# Patient Record
Sex: FEMALE | Race: WHITE | NOT HISPANIC OR LATINO | URBAN - METROPOLITAN AREA
[De-identification: names, ages, dates, MRNs, and addresses within clinical notes are randomized per-mention and may not be internally consistent; named-entity substitution may affect disease eponyms.]

---

## 2017-11-09 ENCOUNTER — EMERGENCY (EMERGENCY)
Facility: HOSPITAL | Age: 52
LOS: 1 days | Discharge: HOME | End: 2017-11-09
Attending: EMERGENCY MEDICINE | Admitting: EMERGENCY MEDICINE
Payer: MEDICAID

## 2017-11-09 VITALS
DIASTOLIC BLOOD PRESSURE: 101 MMHG | OXYGEN SATURATION: 100 % | HEART RATE: 99 BPM | RESPIRATION RATE: 18 BRPM | TEMPERATURE: 99 F | SYSTOLIC BLOOD PRESSURE: 159 MMHG

## 2017-11-09 LAB
ALBUMIN SERPL ELPH-MCNC: 3.8 G/DL — SIGNIFICANT CHANGE UP (ref 3.3–5)
ALP SERPL-CCNC: 91 U/L — SIGNIFICANT CHANGE UP (ref 40–120)
ALT FLD-CCNC: 16 U/L RC — SIGNIFICANT CHANGE UP (ref 10–45)
ANION GAP SERPL CALC-SCNC: 15 MMOL/L — SIGNIFICANT CHANGE UP (ref 5–17)
AST SERPL-CCNC: 21 U/L — SIGNIFICANT CHANGE UP (ref 10–40)
BASE EXCESS BLDV CALC-SCNC: 6 MMOL/L — HIGH (ref -2–2)
BASOPHILS # BLD AUTO: 0 K/UL — SIGNIFICANT CHANGE UP (ref 0–0.2)
BASOPHILS NFR BLD AUTO: 0.3 % — SIGNIFICANT CHANGE UP (ref 0–2)
BILIRUB SERPL-MCNC: 0.4 MG/DL — SIGNIFICANT CHANGE UP (ref 0.2–1.2)
BUN SERPL-MCNC: 12 MG/DL — SIGNIFICANT CHANGE UP (ref 7–23)
CA-I SERPL-SCNC: 1.1 MMOL/L — LOW (ref 1.12–1.3)
CALCIUM SERPL-MCNC: 8.9 MG/DL — SIGNIFICANT CHANGE UP (ref 8.4–10.5)
CHLORIDE BLDV-SCNC: 97 MMOL/L — SIGNIFICANT CHANGE UP (ref 96–108)
CHLORIDE SERPL-SCNC: 94 MMOL/L — LOW (ref 96–108)
CO2 BLDV-SCNC: 31 MMOL/L — HIGH (ref 22–30)
CO2 SERPL-SCNC: 24 MMOL/L — SIGNIFICANT CHANGE UP (ref 22–31)
CREAT SERPL-MCNC: 0.63 MG/DL — SIGNIFICANT CHANGE UP (ref 0.5–1.3)
EOSINOPHIL # BLD AUTO: 0 K/UL — SIGNIFICANT CHANGE UP (ref 0–0.5)
EOSINOPHIL NFR BLD AUTO: 0.7 % — SIGNIFICANT CHANGE UP (ref 0–6)
GAS PNL BLDV: 129 MMOL/L — LOW (ref 136–145)
GAS PNL BLDV: SIGNIFICANT CHANGE UP
GAS PNL BLDV: SIGNIFICANT CHANGE UP
GLUCOSE BLDV-MCNC: 102 MG/DL — HIGH (ref 70–99)
GLUCOSE SERPL-MCNC: 104 MG/DL — HIGH (ref 70–99)
HCO3 BLDV-SCNC: 30 MMOL/L — HIGH (ref 21–29)
HCT VFR BLD CALC: 42.2 % — SIGNIFICANT CHANGE UP (ref 34.5–45)
HCT VFR BLDA CALC: 42 % — SIGNIFICANT CHANGE UP (ref 39–50)
HGB BLD CALC-MCNC: 13.8 G/DL — SIGNIFICANT CHANGE UP (ref 11.5–15.5)
HGB BLD-MCNC: 14.2 G/DL — SIGNIFICANT CHANGE UP (ref 11.5–15.5)
HOROWITZ INDEX BLDV+IHG-RTO: SIGNIFICANT CHANGE UP
LACTATE BLDV-MCNC: 1.5 MMOL/L — SIGNIFICANT CHANGE UP (ref 0.7–2)
LYMPHOCYTES # BLD AUTO: 1 K/UL — SIGNIFICANT CHANGE UP (ref 1–3.3)
LYMPHOCYTES # BLD AUTO: 18.1 % — SIGNIFICANT CHANGE UP (ref 13–44)
MCHC RBC-ENTMCNC: 32.4 PG — SIGNIFICANT CHANGE UP (ref 27–34)
MCHC RBC-ENTMCNC: 33.7 GM/DL — SIGNIFICANT CHANGE UP (ref 32–36)
MCV RBC AUTO: 96.2 FL — SIGNIFICANT CHANGE UP (ref 80–100)
MONOCYTES # BLD AUTO: 0.5 K/UL — SIGNIFICANT CHANGE UP (ref 0–0.9)
MONOCYTES NFR BLD AUTO: 9.3 % — SIGNIFICANT CHANGE UP (ref 2–14)
NEUTROPHILS # BLD AUTO: 3.8 K/UL — SIGNIFICANT CHANGE UP (ref 1.8–7.4)
NEUTROPHILS NFR BLD AUTO: 71.7 % — SIGNIFICANT CHANGE UP (ref 43–77)
PCO2 BLDV: 42 MMHG — SIGNIFICANT CHANGE UP (ref 35–50)
PH BLDV: 7.47 — HIGH (ref 7.35–7.45)
PLATELET # BLD AUTO: 295 K/UL — SIGNIFICANT CHANGE UP (ref 150–400)
PO2 BLDV: 27 MMHG — SIGNIFICANT CHANGE UP (ref 25–45)
POTASSIUM BLDV-SCNC: 3.2 MMOL/L — LOW (ref 3.5–5)
POTASSIUM SERPL-MCNC: 3.3 MMOL/L — LOW (ref 3.5–5.3)
POTASSIUM SERPL-SCNC: 3.3 MMOL/L — LOW (ref 3.5–5.3)
PROT SERPL-MCNC: 7.1 G/DL — SIGNIFICANT CHANGE UP (ref 6–8.3)
RBC # BLD: 4.39 M/UL — SIGNIFICANT CHANGE UP (ref 3.8–5.2)
RBC # FLD: 12.3 % — SIGNIFICANT CHANGE UP (ref 10.3–14.5)
S PYO AG SPEC QL IA: NEGATIVE — SIGNIFICANT CHANGE UP
SAO2 % BLDV: 52 % — LOW (ref 67–88)
SODIUM SERPL-SCNC: 133 MMOL/L — LOW (ref 135–145)
WBC # BLD: 5.3 K/UL — SIGNIFICANT CHANGE UP (ref 3.8–10.5)
WBC # FLD AUTO: 5.3 K/UL — SIGNIFICANT CHANGE UP (ref 3.8–10.5)

## 2017-11-09 PROCEDURE — 99284 EMERGENCY DEPT VISIT MOD MDM: CPT | Mod: 25

## 2017-11-09 RX ORDER — METOCLOPRAMIDE HCL 10 MG
10 TABLET ORAL ONCE
Qty: 0 | Refills: 0 | Status: COMPLETED | OUTPATIENT
Start: 2017-11-09 | End: 2017-11-09

## 2017-11-09 RX ORDER — KETOROLAC TROMETHAMINE 30 MG/ML
30 SYRINGE (ML) INJECTION ONCE
Qty: 0 | Refills: 0 | Status: DISCONTINUED | OUTPATIENT
Start: 2017-11-09 | End: 2017-11-09

## 2017-11-09 RX ORDER — SODIUM CHLORIDE 9 MG/ML
1000 INJECTION INTRAMUSCULAR; INTRAVENOUS; SUBCUTANEOUS ONCE
Qty: 0 | Refills: 0 | Status: COMPLETED | OUTPATIENT
Start: 2017-11-09 | End: 2017-11-09

## 2017-11-09 RX ORDER — ACETAMINOPHEN 500 MG
1000 TABLET ORAL ONCE
Qty: 0 | Refills: 0 | Status: DISCONTINUED | OUTPATIENT
Start: 2017-11-09 | End: 2017-11-09

## 2017-11-09 RX ORDER — POTASSIUM CHLORIDE 20 MEQ
20 PACKET (EA) ORAL ONCE
Qty: 0 | Refills: 0 | Status: COMPLETED | OUTPATIENT
Start: 2017-11-09 | End: 2017-11-09

## 2017-11-09 RX ADMIN — Medication 20 MILLIEQUIVALENT(S): at 23:53

## 2017-11-09 RX ADMIN — Medication 10 MILLIGRAM(S): at 23:07

## 2017-11-09 RX ADMIN — SODIUM CHLORIDE 1000 MILLILITER(S): 9 INJECTION INTRAMUSCULAR; INTRAVENOUS; SUBCUTANEOUS at 23:07

## 2017-11-09 RX ADMIN — Medication 30 MILLIGRAM(S): at 23:53

## 2017-11-09 NOTE — ED PROVIDER NOTE - OBJECTIVE STATEMENT
51y/o F with PMH HTN, TIA (2 months ago), anxiety, GERD, c/o not feeling well x 2 days. pt states she has H/A and dizziness (room spinning) 2/2 high BP. pt admits she missed one of her BP medications today. pt also c/o N/V/D, dry cough, sore throat, sinus congestion, and decreased appetite. no blood in vomit or diarrhea. denies any fever, chills, SOB. pt states she got the flu vaccine recently. pt admits she took her friends percocet last night and today (about 2 hours ago) which did not help relieve her H/A.   PMD Dr. Daigle ([phone]) 51y/o F with PMH HTN, TIA (2 months ago), anxiety, GERD, c/o not feeling well x 2 days, c/o N/V/D, dry cough, sore throat, sinus congestion, HA, and decreased appetite.  also c/o high BP at home and missing her BP meds. no blood in vomit or diarrhea. denies any fever, chills, SOB. pt states she got the flu vaccine recently. pt admits she took her friends percocet last night and today (about 2 hours ago) which did not help relieve her H/A.   PMD Dr. Daigle ([phone])

## 2017-11-09 NOTE — ED PROVIDER NOTE - ATTENDING CONTRIBUTION TO CARE
53 yo female c/o 2d of N/V/D, chills, HA.  Boyfriend with similar symptoms.  Sleeping on my exam, easily arousable, states she took a percocet from her boyfriend before coming to the ER.  Otherwise nontoxic appearing.  Will check labs, give IVF, send RVP, throat swab, reassess.  Almost certainly viral illness.  Very much doubt other emergent infectious, cardiac, or neurologic pathology, and I firmly believe further workup/testing for these etiologies will expose patient to more risk/harm (including the risk of false positive testing) than benefit.

## 2017-11-09 NOTE — ED PROVIDER NOTE - PMH
Anxiety    Constipation    GERD (Gastroesophageal Reflux Disease)    Hypertension    Hypertension    Nephrolithiasis    Vocal Cord Disease

## 2017-11-09 NOTE — ED PROVIDER NOTE - CHIEF COMPLAINT
The patient is a 52y Female complaining of The patient is a 52y Female complaining of not feeling well

## 2017-11-09 NOTE — ED PROVIDER NOTE - INTERPRETATION
EKG reviewed for rate, rhythm, axis, intervals and segments, including QRS morphology, P wave appearance T wave appearance, MA interval, and QT interval.  I find the EKG to be unremarkable in all of these regards except as follows: sinus tach, LVH

## 2017-11-10 VITALS
TEMPERATURE: 98 F | DIASTOLIC BLOOD PRESSURE: 86 MMHG | HEART RATE: 96 BPM | OXYGEN SATURATION: 95 % | SYSTOLIC BLOOD PRESSURE: 142 MMHG | RESPIRATION RATE: 16 BRPM

## 2017-11-10 LAB — RAPID RVP RESULT: SIGNIFICANT CHANGE UP

## 2017-11-10 PROCEDURE — 84132 ASSAY OF SERUM POTASSIUM: CPT

## 2017-11-10 PROCEDURE — 87880 STREP A ASSAY W/OPTIC: CPT

## 2017-11-10 PROCEDURE — 96374 THER/PROPH/DIAG INJ IV PUSH: CPT

## 2017-11-10 PROCEDURE — 87081 CULTURE SCREEN ONLY: CPT

## 2017-11-10 PROCEDURE — 82330 ASSAY OF CALCIUM: CPT

## 2017-11-10 PROCEDURE — 87798 DETECT AGENT NOS DNA AMP: CPT

## 2017-11-10 PROCEDURE — 83605 ASSAY OF LACTIC ACID: CPT

## 2017-11-10 PROCEDURE — 84295 ASSAY OF SERUM SODIUM: CPT

## 2017-11-10 PROCEDURE — 82435 ASSAY OF BLOOD CHLORIDE: CPT

## 2017-11-10 PROCEDURE — 85014 HEMATOCRIT: CPT

## 2017-11-10 PROCEDURE — 85027 COMPLETE CBC AUTOMATED: CPT

## 2017-11-10 PROCEDURE — 87581 M.PNEUMON DNA AMP PROBE: CPT

## 2017-11-10 PROCEDURE — 96375 TX/PRO/DX INJ NEW DRUG ADDON: CPT

## 2017-11-10 PROCEDURE — 82803 BLOOD GASES ANY COMBINATION: CPT

## 2017-11-10 PROCEDURE — 99284 EMERGENCY DEPT VISIT MOD MDM: CPT | Mod: 25

## 2017-11-10 PROCEDURE — 87486 CHLMYD PNEUM DNA AMP PROBE: CPT

## 2017-11-10 PROCEDURE — 87633 RESP VIRUS 12-25 TARGETS: CPT

## 2017-11-10 PROCEDURE — 82947 ASSAY GLUCOSE BLOOD QUANT: CPT

## 2017-11-10 PROCEDURE — 80053 COMPREHEN METABOLIC PANEL: CPT

## 2017-11-10 RX ADMIN — SODIUM CHLORIDE 1000 MILLILITER(S): 9 INJECTION INTRAMUSCULAR; INTRAVENOUS; SUBCUTANEOUS at 00:03

## 2017-11-10 RX ADMIN — Medication 30 MILLIGRAM(S): at 00:31

## 2017-11-10 NOTE — ED ADULT NURSE REASSESSMENT NOTE - NS ED NURSE REASSESS COMMENT FT1
Patient A&Ox3, VSS. Dx vial gastroenteritis. Discharged home to follow up with PMD. Patient A&Ox3, VSS. Dx vial gastroenteritis. Malachi SU states okay to discharge without stool sample. Discharged home to follow up with PMD.

## 2018-02-13 ENCOUNTER — APPOINTMENT (OUTPATIENT)
Dept: CT IMAGING | Facility: CLINIC | Age: 53
End: 2018-02-13
Payer: MEDICAID

## 2018-02-13 ENCOUNTER — OUTPATIENT (OUTPATIENT)
Dept: OUTPATIENT SERVICES | Facility: HOSPITAL | Age: 53
LOS: 1 days | End: 2018-02-13
Payer: MEDICAID

## 2018-02-13 DIAGNOSIS — Z00.8 ENCOUNTER FOR OTHER GENERAL EXAMINATION: ICD-10-CM

## 2018-02-13 PROCEDURE — 70496 CT ANGIOGRAPHY HEAD: CPT | Mod: 26

## 2018-02-13 PROCEDURE — 70498 CT ANGIOGRAPHY NECK: CPT | Mod: 26

## 2018-02-13 PROCEDURE — 70498 CT ANGIOGRAPHY NECK: CPT

## 2018-02-13 PROCEDURE — 70496 CT ANGIOGRAPHY HEAD: CPT

## 2018-02-13 PROCEDURE — 82565 ASSAY OF CREATININE: CPT

## 2018-02-26 ENCOUNTER — OUTPATIENT (OUTPATIENT)
Dept: OUTPATIENT SERVICES | Facility: HOSPITAL | Age: 53
LOS: 1 days | End: 2018-02-26
Payer: MEDICAID

## 2018-02-26 ENCOUNTER — APPOINTMENT (OUTPATIENT)
Dept: CV DIAGNOSITCS | Facility: HOSPITAL | Age: 53
End: 2018-02-26

## 2018-02-26 DIAGNOSIS — I25.10 ATHEROSCLEROTIC HEART DISEASE OF NATIVE CORONARY ARTERY WITHOUT ANGINA PECTORIS: ICD-10-CM

## 2018-02-26 PROCEDURE — 93312 ECHO TRANSESOPHAGEAL: CPT

## 2018-02-26 PROCEDURE — 93312 ECHO TRANSESOPHAGEAL: CPT | Mod: 26

## 2018-02-26 PROCEDURE — 93306 TTE W/DOPPLER COMPLETE: CPT

## 2018-02-26 PROCEDURE — 93306 TTE W/DOPPLER COMPLETE: CPT | Mod: 26

## 2018-02-27 ENCOUNTER — RESULT CHARGE (OUTPATIENT)
Age: 53
End: 2018-02-27

## 2018-02-27 ENCOUNTER — APPOINTMENT (OUTPATIENT)
Dept: NEUROSURGERY | Facility: CLINIC | Age: 53
End: 2018-02-27
Payer: MEDICAID

## 2018-02-27 ENCOUNTER — OTHER (OUTPATIENT)
Age: 53
End: 2018-02-27

## 2018-02-27 ENCOUNTER — MED ADMIN CHARGE (OUTPATIENT)
Age: 53
End: 2018-02-27

## 2018-02-27 DIAGNOSIS — I65.29 OCCLUSION AND STENOSIS OF UNSPECIFIED CAROTID ARTERY: ICD-10-CM

## 2018-02-27 DIAGNOSIS — Z80.0 FAMILY HISTORY OF MALIGNANT NEOPLASM OF DIGESTIVE ORGANS: ICD-10-CM

## 2018-02-27 DIAGNOSIS — Z56.0 UNEMPLOYMENT, UNSPECIFIED: ICD-10-CM

## 2018-02-27 DIAGNOSIS — Z86.79 PERSONAL HISTORY OF OTHER DISEASES OF THE CIRCULATORY SYSTEM: ICD-10-CM

## 2018-02-27 DIAGNOSIS — F17.200 NICOTINE DEPENDENCE, UNSPECIFIED, UNCOMPLICATED: ICD-10-CM

## 2018-02-27 DIAGNOSIS — Z78.9 OTHER SPECIFIED HEALTH STATUS: ICD-10-CM

## 2018-02-27 PROCEDURE — 99203 OFFICE O/P NEW LOW 30 MIN: CPT

## 2018-02-27 RX ORDER — HYDROCHLOROTHIAZIDE 25 MG/1
25 TABLET ORAL
Refills: 0 | Status: ACTIVE | COMMUNITY

## 2018-02-27 RX ORDER — FELODIPINE 5 MG/1
5 TABLET, EXTENDED RELEASE ORAL
Refills: 0 | Status: ACTIVE | COMMUNITY

## 2018-02-27 RX ORDER — ENALAPRIL MALEATE 5 MG/1
TABLET ORAL
Refills: 0 | Status: ACTIVE | COMMUNITY

## 2018-02-27 RX ORDER — CLOPIDOGREL BISULFATE 75 MG/1
75 TABLET, FILM COATED ORAL
Refills: 0 | Status: ACTIVE | COMMUNITY

## 2018-02-27 RX ORDER — ASPIRIN 81 MG
81 TABLET, DELAYED RELEASE (ENTERIC COATED) ORAL
Refills: 0 | Status: ACTIVE | COMMUNITY

## 2018-02-27 RX ORDER — ALPRAZOLAM 2 MG/1
TABLET ORAL
Refills: 0 | Status: ACTIVE | COMMUNITY

## 2018-02-27 RX ORDER — ACYCLOVIR 200 MG/1
CAPSULE ORAL
Refills: 0 | Status: ACTIVE | COMMUNITY

## 2018-02-27 RX ORDER — METOPROLOL TARTRATE 50 MG/1
TABLET ORAL
Refills: 0 | Status: ACTIVE | COMMUNITY

## 2018-02-27 RX ORDER — ROSUVASTATIN CALCIUM 5 MG/1
TABLET, FILM COATED ORAL
Refills: 0 | Status: ACTIVE | COMMUNITY

## 2018-02-27 SDOH — ECONOMIC STABILITY - INCOME SECURITY: UNEMPLOYMENT, UNSPECIFIED: Z56.0

## 2018-04-24 ENCOUNTER — OUTPATIENT (OUTPATIENT)
Dept: OUTPATIENT SERVICES | Facility: HOSPITAL | Age: 53
LOS: 1 days | End: 2018-04-24
Payer: MEDICAID

## 2018-04-24 VITALS
RESPIRATION RATE: 16 BRPM | HEIGHT: 60 IN | DIASTOLIC BLOOD PRESSURE: 66 MMHG | OXYGEN SATURATION: 97 % | WEIGHT: 119.05 LBS | TEMPERATURE: 98 F | HEART RATE: 72 BPM | SYSTOLIC BLOOD PRESSURE: 109 MMHG

## 2018-04-24 DIAGNOSIS — I66.9 OCCLUSION AND STENOSIS OF UNSPECIFIED CEREBRAL ARTERY: ICD-10-CM

## 2018-04-24 DIAGNOSIS — J38.3 OTHER DISEASES OF VOCAL CORDS: Chronic | ICD-10-CM

## 2018-04-24 PROCEDURE — 93005 ELECTROCARDIOGRAM TRACING: CPT

## 2018-04-24 PROCEDURE — 93010 ELECTROCARDIOGRAM REPORT: CPT

## 2018-04-24 PROCEDURE — 33282: CPT

## 2018-04-24 NOTE — H&P CARDIOLOGY - HISTORY OF PRESENT ILLNESS
Patient 46 yo  female with PMHx of HTN, GERD Hx of Gallstone Pancreatitis, tonsillar mass & vocal cord edema, carotid stenosis, multiple TIAs and CVAs, recent CVA (Jan 2018 received TPA) residual left side weakness, undergoing PT rehab. sessions. Pt went for follow up CTA image of head and neck that revealed right ICA 47% stenosis right ICA.  Pt presents today for loop recorder implant with Dr Devlin.

## 2018-04-24 NOTE — H&P CARDIOLOGY - PMH
Anxiety    Constipation    GERD (Gastroesophageal Reflux Disease)    Hypertension    Nephrolithiasis    Vocal Cord Disease

## 2018-06-27 ENCOUNTER — INPATIENT (INPATIENT)
Facility: HOSPITAL | Age: 53
LOS: 3 days | Discharge: HOME | DRG: 386 | End: 2018-07-01
Attending: INTERNAL MEDICINE | Admitting: INTERNAL MEDICINE
Payer: MEDICAID

## 2018-06-27 VITALS
OXYGEN SATURATION: 97 % | HEART RATE: 75 BPM | DIASTOLIC BLOOD PRESSURE: 83 MMHG | RESPIRATION RATE: 18 BRPM | TEMPERATURE: 98 F | SYSTOLIC BLOOD PRESSURE: 116 MMHG

## 2018-06-27 DIAGNOSIS — N39.0 URINARY TRACT INFECTION, SITE NOT SPECIFIED: ICD-10-CM

## 2018-06-27 DIAGNOSIS — K21.9 GASTRO-ESOPHAGEAL REFLUX DISEASE WITHOUT ESOPHAGITIS: ICD-10-CM

## 2018-06-27 DIAGNOSIS — D73.5 INFARCTION OF SPLEEN: ICD-10-CM

## 2018-06-27 DIAGNOSIS — I25.10 ATHEROSCLEROTIC HEART DISEASE OF NATIVE CORONARY ARTERY WITHOUT ANGINA PECTORIS: ICD-10-CM

## 2018-06-27 DIAGNOSIS — K51.00 ULCERATIVE (CHRONIC) PANCOLITIS WITHOUT COMPLICATIONS: ICD-10-CM

## 2018-06-27 DIAGNOSIS — Z72.0 TOBACCO USE: ICD-10-CM

## 2018-06-27 DIAGNOSIS — E27.9 DISORDER OF ADRENAL GLAND, UNSPECIFIED: ICD-10-CM

## 2018-06-27 DIAGNOSIS — R93.5 ABNORMAL FINDINGS ON DIAGNOSTIC IMAGING OF OTHER ABDOMINAL REGIONS, INCLUDING RETROPERITONEUM: ICD-10-CM

## 2018-06-27 DIAGNOSIS — Z29.9 ENCOUNTER FOR PROPHYLACTIC MEASURES, UNSPECIFIED: ICD-10-CM

## 2018-06-27 DIAGNOSIS — F41.9 ANXIETY DISORDER, UNSPECIFIED: ICD-10-CM

## 2018-06-27 DIAGNOSIS — R19.5 OTHER FECAL ABNORMALITIES: ICD-10-CM

## 2018-06-27 DIAGNOSIS — I10 ESSENTIAL (PRIMARY) HYPERTENSION: ICD-10-CM

## 2018-06-27 DIAGNOSIS — J38.3 OTHER DISEASES OF VOCAL CORDS: Chronic | ICD-10-CM

## 2018-06-27 LAB
ALBUMIN SERPL ELPH-MCNC: 4.6 G/DL — SIGNIFICANT CHANGE UP (ref 3.3–5)
ALP SERPL-CCNC: 125 U/L — HIGH (ref 40–120)
ALT FLD-CCNC: 53 U/L — HIGH (ref 10–45)
ANION GAP SERPL CALC-SCNC: 15 MMOL/L — SIGNIFICANT CHANGE UP (ref 5–17)
APPEARANCE UR: ABNORMAL
APTT BLD: 26.3 SEC — LOW (ref 27.5–37.4)
AST SERPL-CCNC: 37 U/L — SIGNIFICANT CHANGE UP (ref 10–40)
BASOPHILS # BLD AUTO: 0 K/UL — SIGNIFICANT CHANGE UP (ref 0–0.2)
BASOPHILS NFR BLD AUTO: 0.2 % — SIGNIFICANT CHANGE UP (ref 0–2)
BILIRUB SERPL-MCNC: 0.3 MG/DL — SIGNIFICANT CHANGE UP (ref 0.2–1.2)
BILIRUB UR-MCNC: NEGATIVE — SIGNIFICANT CHANGE UP
BUN SERPL-MCNC: 24 MG/DL — HIGH (ref 7–23)
CALCIUM SERPL-MCNC: 10.5 MG/DL — SIGNIFICANT CHANGE UP (ref 8.4–10.5)
CHLORIDE SERPL-SCNC: 98 MMOL/L — SIGNIFICANT CHANGE UP (ref 96–108)
CO2 SERPL-SCNC: 25 MMOL/L — SIGNIFICANT CHANGE UP (ref 22–31)
COLOR SPEC: ABNORMAL
CREAT SERPL-MCNC: 1.17 MG/DL — SIGNIFICANT CHANGE UP (ref 0.5–1.3)
DIFF PNL FLD: ABNORMAL
EOSINOPHIL # BLD AUTO: 0.1 K/UL — SIGNIFICANT CHANGE UP (ref 0–0.5)
EOSINOPHIL NFR BLD AUTO: 0.4 % — SIGNIFICANT CHANGE UP (ref 0–6)
GAS PNL BLDV: SIGNIFICANT CHANGE UP
GLUCOSE SERPL-MCNC: 107 MG/DL — HIGH (ref 70–99)
GLUCOSE UR QL: NEGATIVE — SIGNIFICANT CHANGE UP
HCG UR QL: NEGATIVE — SIGNIFICANT CHANGE UP
HCT VFR BLD CALC: 47.1 % — HIGH (ref 34.5–45)
HGB BLD-MCNC: 15.6 G/DL — HIGH (ref 11.5–15.5)
INR BLD: 0.97 RATIO — SIGNIFICANT CHANGE UP (ref 0.88–1.16)
KETONES UR-MCNC: NEGATIVE — SIGNIFICANT CHANGE UP
LEUKOCYTE ESTERASE UR-ACNC: ABNORMAL
LIDOCAIN IGE QN: 43 U/L — SIGNIFICANT CHANGE UP (ref 7–60)
LYMPHOCYTES # BLD AUTO: 1.4 K/UL — SIGNIFICANT CHANGE UP (ref 1–3.3)
LYMPHOCYTES # BLD AUTO: 9.1 % — LOW (ref 13–44)
MCHC RBC-ENTMCNC: 32.2 PG — SIGNIFICANT CHANGE UP (ref 27–34)
MCHC RBC-ENTMCNC: 33.1 GM/DL — SIGNIFICANT CHANGE UP (ref 32–36)
MCV RBC AUTO: 97.1 FL — SIGNIFICANT CHANGE UP (ref 80–100)
MONOCYTES # BLD AUTO: 0.9 K/UL — SIGNIFICANT CHANGE UP (ref 0–0.9)
MONOCYTES NFR BLD AUTO: 5.7 % — SIGNIFICANT CHANGE UP (ref 2–14)
NEUTROPHILS # BLD AUTO: 12.9 K/UL — HIGH (ref 1.8–7.4)
NEUTROPHILS NFR BLD AUTO: 84.6 % — HIGH (ref 43–77)
NITRITE UR-MCNC: NEGATIVE — SIGNIFICANT CHANGE UP
PH UR: 5.5 — SIGNIFICANT CHANGE UP (ref 5–8)
PLATELET # BLD AUTO: 485 K/UL — HIGH (ref 150–400)
POTASSIUM SERPL-MCNC: 3.7 MMOL/L — SIGNIFICANT CHANGE UP (ref 3.5–5.3)
POTASSIUM SERPL-SCNC: 3.7 MMOL/L — SIGNIFICANT CHANGE UP (ref 3.5–5.3)
PROT SERPL-MCNC: 7.9 G/DL — SIGNIFICANT CHANGE UP (ref 6–8.3)
PROT UR-MCNC: 30 MG/DL
PROTHROM AB SERPL-ACNC: 10.5 SEC — SIGNIFICANT CHANGE UP (ref 9.8–12.7)
RBC # BLD: 4.85 M/UL — SIGNIFICANT CHANGE UP (ref 3.8–5.2)
RBC # FLD: 12 % — SIGNIFICANT CHANGE UP (ref 10.3–14.5)
SODIUM SERPL-SCNC: 138 MMOL/L — SIGNIFICANT CHANGE UP (ref 135–145)
SP GR SPEC: 1.02 — SIGNIFICANT CHANGE UP (ref 1.01–1.02)
UROBILINOGEN FLD QL: NEGATIVE — SIGNIFICANT CHANGE UP
WBC # BLD: 15.2 K/UL — HIGH (ref 3.8–10.5)
WBC # FLD AUTO: 15.2 K/UL — HIGH (ref 3.8–10.5)

## 2018-06-27 PROCEDURE — 74177 CT ABD & PELVIS W/CONTRAST: CPT | Mod: 26

## 2018-06-27 PROCEDURE — 99223 1ST HOSP IP/OBS HIGH 75: CPT

## 2018-06-27 PROCEDURE — 99285 EMERGENCY DEPT VISIT HI MDM: CPT

## 2018-06-27 RX ORDER — PANTOPRAZOLE SODIUM 20 MG/1
40 TABLET, DELAYED RELEASE ORAL
Qty: 0 | Refills: 0 | Status: DISCONTINUED | OUTPATIENT
Start: 2018-06-27 | End: 2018-07-01

## 2018-06-27 RX ORDER — ALPRAZOLAM 0.25 MG
0.5 TABLET ORAL THREE TIMES A DAY
Qty: 0 | Refills: 0 | Status: DISCONTINUED | OUTPATIENT
Start: 2018-06-27 | End: 2018-07-01

## 2018-06-27 RX ORDER — CLOPIDOGREL BISULFATE 75 MG/1
75 TABLET, FILM COATED ORAL DAILY
Qty: 0 | Refills: 0 | Status: DISCONTINUED | OUTPATIENT
Start: 2018-06-27 | End: 2018-07-01

## 2018-06-27 RX ORDER — MORPHINE SULFATE 50 MG/1
4 CAPSULE, EXTENDED RELEASE ORAL ONCE
Qty: 0 | Refills: 0 | Status: DISCONTINUED | OUTPATIENT
Start: 2018-06-27 | End: 2018-06-27

## 2018-06-27 RX ORDER — METOPROLOL TARTRATE 50 MG
200 TABLET ORAL DAILY
Qty: 0 | Refills: 0 | Status: DISCONTINUED | OUTPATIENT
Start: 2018-06-27 | End: 2018-07-01

## 2018-06-27 RX ORDER — ROSUVASTATIN CALCIUM 5 MG/1
1 TABLET ORAL
Qty: 0 | Refills: 0 | COMMUNITY

## 2018-06-27 RX ORDER — CARBAMAZEPINE 200 MG
1 TABLET ORAL
Qty: 0 | Refills: 0 | COMMUNITY

## 2018-06-27 RX ORDER — FLUTICASONE PROPIONATE 50 MCG
1 SPRAY, SUSPENSION NASAL DAILY
Qty: 0 | Refills: 0 | Status: DISCONTINUED | OUTPATIENT
Start: 2018-06-27 | End: 2018-07-01

## 2018-06-27 RX ORDER — FELODIPINE 5 MG/1
1 TABLET, FILM COATED, EXTENDED RELEASE ORAL
Qty: 0 | Refills: 0 | COMMUNITY

## 2018-06-27 RX ORDER — ASPIRIN/CALCIUM CARB/MAGNESIUM 324 MG
1 TABLET ORAL
Qty: 0 | Refills: 0 | COMMUNITY

## 2018-06-27 RX ORDER — SODIUM CHLORIDE 9 MG/ML
1000 INJECTION INTRAMUSCULAR; INTRAVENOUS; SUBCUTANEOUS ONCE
Qty: 0 | Refills: 0 | Status: COMPLETED | OUTPATIENT
Start: 2018-06-27 | End: 2018-06-27

## 2018-06-27 RX ORDER — CARBAMAZEPINE 200 MG
2 TABLET ORAL
Qty: 0 | Refills: 0 | COMMUNITY

## 2018-06-27 RX ORDER — SODIUM CHLORIDE 9 MG/ML
1000 INJECTION INTRAMUSCULAR; INTRAVENOUS; SUBCUTANEOUS
Qty: 0 | Refills: 0 | Status: DISCONTINUED | OUTPATIENT
Start: 2018-06-27 | End: 2018-07-01

## 2018-06-27 RX ORDER — CLOPIDOGREL BISULFATE 75 MG/1
1 TABLET, FILM COATED ORAL
Qty: 0 | Refills: 0 | COMMUNITY

## 2018-06-27 RX ORDER — LIDOCAINE 4 G/100G
10 CREAM TOPICAL ONCE
Qty: 0 | Refills: 0 | Status: COMPLETED | OUTPATIENT
Start: 2018-06-27 | End: 2018-06-27

## 2018-06-27 RX ORDER — ALPRAZOLAM 0.25 MG
1 TABLET ORAL
Qty: 0 | Refills: 0 | COMMUNITY

## 2018-06-27 RX ORDER — METOPROLOL TARTRATE 50 MG
1 TABLET ORAL
Qty: 0 | Refills: 0 | COMMUNITY

## 2018-06-27 RX ORDER — ESOMEPRAZOLE MAGNESIUM 40 MG/1
1 CAPSULE, DELAYED RELEASE ORAL
Qty: 0 | Refills: 0 | COMMUNITY

## 2018-06-27 RX ORDER — ROSUVASTATIN CALCIUM 5 MG/1
10 TABLET ORAL AT BEDTIME
Qty: 0 | Refills: 0 | Status: DISCONTINUED | OUTPATIENT
Start: 2018-06-27 | End: 2018-07-01

## 2018-06-27 RX ORDER — ENALAPRIL MALEATE AND HYDROCHLOROTHIAZIDE 10; 25 MG/1; MG/1
1 TABLET ORAL
Qty: 0 | Refills: 0 | COMMUNITY

## 2018-06-27 RX ORDER — METRONIDAZOLE 500 MG
500 TABLET ORAL ONCE
Qty: 0 | Refills: 0 | Status: COMPLETED | OUTPATIENT
Start: 2018-06-27 | End: 2018-06-27

## 2018-06-27 RX ORDER — FLUTICASONE PROPIONATE 50 MCG
2 SPRAY, SUSPENSION NASAL
Qty: 0 | Refills: 0 | COMMUNITY

## 2018-06-27 RX ORDER — MORPHINE SULFATE 50 MG/1
2 CAPSULE, EXTENDED RELEASE ORAL ONCE
Qty: 0 | Refills: 0 | Status: DISCONTINUED | OUTPATIENT
Start: 2018-06-27 | End: 2018-06-27

## 2018-06-27 RX ORDER — METRONIDAZOLE 500 MG
500 TABLET ORAL EVERY 8 HOURS
Qty: 0 | Refills: 0 | Status: DISCONTINUED | OUTPATIENT
Start: 2018-06-27 | End: 2018-07-01

## 2018-06-27 RX ORDER — ASPIRIN/CALCIUM CARB/MAGNESIUM 324 MG
81 TABLET ORAL DAILY
Qty: 0 | Refills: 0 | Status: DISCONTINUED | OUTPATIENT
Start: 2018-06-27 | End: 2018-07-01

## 2018-06-27 RX ORDER — SPIRONOLACTONE 25 MG/1
25 TABLET, FILM COATED ORAL
Qty: 0 | Refills: 0 | Status: DISCONTINUED | OUTPATIENT
Start: 2018-06-27 | End: 2018-07-01

## 2018-06-27 RX ORDER — SUCRALFATE 1 G
1 TABLET ORAL
Qty: 0 | Refills: 0 | Status: DISCONTINUED | OUTPATIENT
Start: 2018-06-27 | End: 2018-06-30

## 2018-06-27 RX ORDER — SPIRONOLACTONE 25 MG/1
1 TABLET, FILM COATED ORAL
Qty: 0 | Refills: 0 | COMMUNITY

## 2018-06-27 RX ORDER — CIPROFLOXACIN LACTATE 400MG/40ML
400 VIAL (ML) INTRAVENOUS EVERY 12 HOURS
Qty: 0 | Refills: 0 | Status: DISCONTINUED | OUTPATIENT
Start: 2018-06-27 | End: 2018-07-01

## 2018-06-27 RX ADMIN — SODIUM CHLORIDE 4000 MILLILITER(S): 9 INJECTION INTRAMUSCULAR; INTRAVENOUS; SUBCUTANEOUS at 11:29

## 2018-06-27 RX ADMIN — MORPHINE SULFATE 4 MILLIGRAM(S): 50 CAPSULE, EXTENDED RELEASE ORAL at 16:00

## 2018-06-27 RX ADMIN — LIDOCAINE 10 MILLILITER(S): 4 CREAM TOPICAL at 11:35

## 2018-06-27 RX ADMIN — MORPHINE SULFATE 2 MILLIGRAM(S): 50 CAPSULE, EXTENDED RELEASE ORAL at 13:25

## 2018-06-27 RX ADMIN — MORPHINE SULFATE 4 MILLIGRAM(S): 50 CAPSULE, EXTENDED RELEASE ORAL at 14:49

## 2018-06-27 RX ADMIN — MORPHINE SULFATE 4 MILLIGRAM(S): 50 CAPSULE, EXTENDED RELEASE ORAL at 14:00

## 2018-06-27 RX ADMIN — MORPHINE SULFATE 4 MILLIGRAM(S): 50 CAPSULE, EXTENDED RELEASE ORAL at 12:54

## 2018-06-27 RX ADMIN — Medication 100 MILLIGRAM(S): at 22:32

## 2018-06-27 RX ADMIN — Medication 200 MILLIGRAM(S): at 16:22

## 2018-06-27 RX ADMIN — MORPHINE SULFATE 2 MILLIGRAM(S): 50 CAPSULE, EXTENDED RELEASE ORAL at 11:29

## 2018-06-27 RX ADMIN — Medication 30 MILLILITER(S): at 11:36

## 2018-06-27 NOTE — CONSULT NOTE ADULT - ASSESSMENT
[pt is well known to me with hx active smoker with multiple cva s/p recent loop implant with bleeding and possible splenic infarct  r/o PAF will check loop recorder  cta of chest abdomen and pelvis  hem consult

## 2018-06-27 NOTE — H&P ADULT - PROBLEM SELECTOR PLAN 2
Splenic infarct suggestive of occult thromboembolic event. Patient endorses passing blood in her stool and is FOBT positive, will workup for occult arrhythmia however, not start anticoagulation at this time.   Monitor on telemetry  Evaluate loop recorder in AM  Follow up cardiology recommendations  CTA C/A/P in 24 hours Splenic infarct suggestive of occult thromboembolic event vs hypercoagulable state. Patient endorses passing blood in her stool and is FOBT positive, will workup for occult arrhythmia however, not start anticoagulation at this time.   Monitor on telemetry  Evaluate loop recorder in AM  Follow up cardiology recommendations  CTA C/A/P in 24 hours Splenic infarct suggestive of occult thromboembolic event especially given history of CVA vs hypercoagulable state. Patient endorses passing blood in her stool and is FOBT positive, will workup for occult arrhythmia however, not start anticoagulation at this time.   Monitor on telemetry  Evaluate loop recorder in AM  Follow up cardiology recommendations  CTA C/A/P in 24 hours  Can consult hematology team Splenic infarct suggestive of occult thromboembolic event especially given history of CVA vs hypercoagulable state. Patient endorses passing blood in her stool and is FOBT positive, will workup for occult arrhythmia however, not start anticoagulation at this time.   Monitor on telemetry  Evaluate loop recorder in AM  Follow up cardiology recommendations  CTA C/A/P in 24 hours  Can consult hematology team, however hypercoagulable workup to assess factor V leiden, antithrombin III, protein C and S has not been found to improve outcomes in patients with first DVT or VTE event

## 2018-06-27 NOTE — H&P ADULT - NSHPLABSRESULTS_GEN_ALL_CORE
LABS:                        15.6   15.2  )-----------( 485      ( 2018 11:30 )             47.1     WBC Trend: 15.2<--      138  |  98  |  24<H>  ----------------------------<  107<H>  3.7   |  25  |  1.17    Ca    10.5      2018 11:30    TPro  7.9  /  Alb  4.6  /  TBili  0.3  /  DBili  x   /  AST  37  /  ALT  53<H>  /  AlkPhos  125<H>      Creatinine Trend: 1.17<--  PT/INR - ( 2018 11:30 )   PT: 10.5 sec;   INR: 0.97 ratio         PTT - ( 2018 11:30 )  PTT:26.3 sec      Urinalysis Basic - ( 2018 14:04 )    Color: Red / Appearance: Turbid / S.021 / pH: x  Gluc: x / Ketone: Negative  / Bili: Negative / Urobili: Negative   Blood: x / Protein: 30 mg/dL / Nitrite: Negative   Leuk Esterase: Large / RBC: x / WBC 2-5 /HPF   Sq Epi: x / Non Sq Epi: Occasional /HPF / Bacteria: Moderate /HPF    Radiology:   < from: CT Abdomen and Pelvis w/ Oral Cont and w/ IV Cont (18 @ 14:14) >      EXAM:  CT ABDOMEN AND PELVIS OC IC                            PROCEDURE DATE:  2018            INTERPRETATION:  CLINICAL INFORMATION: Abdominal pain left lower quadrant   pain with bloody diarrhea for one day    COMPARISON: MR of the abdomen with and without IV contrast 10/25/2010    PROCEDURE:   CT of the Abdomen and Pelvis was performed with intravenous contrast.   Intravenous contrast: 90 ml Omnipaque 350. 10 ml discarded.  Oral contrast: positive contrast was administered.  Sagittal and coronal reformats were performed.    FINDINGS:    LOWER CHEST: Within normal limits.    LIVER: Cirrhotic liver morphology and hepatomegaly.  BILE DUCTS: The common bile duct is dilated measuring up to 1.0 cm.  GALLBLADDER: Cholecystectomy.  SPLEEN: Focal wedge-shaped hypodensities within the spleen consistent   with splenic infarct.  PANCREAS: Within normal limits.  ADRENALS: Right-sided adrenal nodule measuring 2.1 x 1.7 cm.  KIDNEYS/URETERS: Bilateral irregular cortical hypodensities.     BLADDER: Within normal limits.  REPRODUCTIVE ORGANS: The uterus is within normal limits. There is a 3.6   cm right-sided ovarian cyst.    BOWEL: No bowel obstruction. Mild pancolonic thickening. Questionable   inflammation of the terminal ileum. Appendix is normal. Duodenal   diverticulum.  PERITONEUM: No ascites.  VESSELS:  Atherosclerotic changes.  RETROPERITONEUM: No lymphadenopathy.    ABDOMINAL WALL: Within normal limits.  BONES: Degenerative changes of the spine.     IMPRESSION:   Mild pancolitis. Questionable inflammation of the terminal ileum.    Upper pole splenic infarcts.    Bilateral irregular cortical hypodense areas in the kidneys of uncertain   etiology; possibly small infarcts or pyelonephritis.    Findings discussed with Dr. Cazares of ED by Dr. Montano at 2:39 PM 18    < end of copied text >    Consults: Cardiology note reviewed. LABS:                        15.6   15.2  )-----------( 485      ( 2018 11:30 )             47.1     WBC Trend: 15.2<--      138  |  98  |  24<H>  ----------------------------<  107<H>  3.7   |  25  |  1.17    Ca    10.5      2018 11:30    TPro  7.9  /  Alb  4.6  /  TBili  0.3  /  DBili  x   /  AST  37  /  ALT  53<H>  /  AlkPhos  125<H>      Creatinine Trend: 1.17<--  PT/INR - ( 2018 11:30 )   PT: 10.5 sec;   INR: 0.97 ratio         PTT - ( 2018 11:30 )  PTT:26.3 sec      Urinalysis Basic - ( 2018 14:04 )    Color: Red / Appearance: Turbid / S.021 / pH: x  Gluc: x / Ketone: Negative  / Bili: Negative / Urobili: Negative   Blood: x / Protein: 30 mg/dL / Nitrite: Negative   Leuk Esterase: Large / RBC: x / WBC 2-5 /HPF   Sq Epi: x / Non Sq Epi: Occasional /HPF / Bacteria: Moderate /HPF    Radiology:   < from: CT Abdomen and Pelvis w/ Oral Cont and w/ IV Cont (18 @ 14:14) >      EXAM:  CT ABDOMEN AND PELVIS OC IC                            PROCEDURE DATE:  2018            INTERPRETATION:  CLINICAL INFORMATION: Abdominal pain left lower quadrant   pain with bloody diarrhea for one day    COMPARISON: MR of the abdomen with and without IV contrast 10/25/2010    PROCEDURE:   CT of the Abdomen and Pelvis was performed with intravenous contrast.   Intravenous contrast: 90 ml Omnipaque 350. 10 ml discarded.  Oral contrast: positive contrast was administered.  Sagittal and coronal reformats were performed.    FINDINGS:    LOWER CHEST: Within normal limits.    LIVER: Cirrhotic liver morphology and hepatomegaly.  BILE DUCTS: The common bile duct is dilated measuring up to 1.0 cm.  GALLBLADDER: Cholecystectomy.  SPLEEN: Focal wedge-shaped hypodensities within the spleen consistent   with splenic infarct.  PANCREAS: Within normal limits.  ADRENALS: Right-sided adrenal nodule measuring 2.1 x 1.7 cm.  KIDNEYS/URETERS: Bilateral irregular cortical hypodensities.     BLADDER: Within normal limits.  REPRODUCTIVE ORGANS: The uterus is within normal limits. There is a 3.6   cm right-sided ovarian cyst.    BOWEL: No bowel obstruction. Mild pancolonic thickening. Questionable   inflammation of the terminal ileum. Appendix is normal. Duodenal   diverticulum.  PERITONEUM: No ascites.  VESSELS:  Atherosclerotic changes.  RETROPERITONEUM: No lymphadenopathy.    ABDOMINAL WALL: Within normal limits.  BONES: Degenerative changes of the spine.     IMPRESSION:   Mild pancolitis. Questionable inflammation of the terminal ileum.    Upper pole splenic infarcts.    Bilateral irregular cortical hypodense areas in the kidneys of uncertain   etiology; possibly small infarcts or pyelonephritis.    Findings discussed with Dr. Cazares of ED by Dr. Montano at 2:39 PM 18    < end of copied text >    Consults: Cardiology note reviewed.    EKG reviewed by me NSR HR 70s, without ischemic ST or T wave changes

## 2018-06-27 NOTE — ED ADULT NURSE NOTE - OBJECTIVE STATEMENT
52yo female presents to ED c/o 52yo female presents to ED c/o abd pain, n/v/d for 1 week. Pt states she has vicky having a "squeezing" pain in her abdomen that travels and feels worse on RLQ. She states she is either "constipated or having diarrhea with no in between". She states today pain became unbearable and she had red emesis and red blood in the stool. She reports feeling slightly lightheaded. She is ambulatory in ED without difficulty. She is on ASA and Plavix for carotid stenosis, has h/o of stroke this year and TIA with residual left sided weakness/ paresthesias. She reports some SOB. Resp are non labored at this time. She denies chest pain, fevers and chills. At this time VSS/ NAD. Safety and comfort maintained. Side rails up to prevent falls. Will continue to monitor.

## 2018-06-27 NOTE — H&P ADULT - PROBLEM SELECTOR PLAN 10
- venodyne boots  - check for resolution of fecal blood loss - heparin subQ 5000 q12h, venodyne boots  - check for resolution of fecal blood loss - venodyne boots  - start heparin subQ [id number] after resolution of rectal bleeding - venodyne boots  IMPROVE score 0   Can consider pharmacologic prophylaxis after resolution of bloody stools

## 2018-06-27 NOTE — H&P ADULT - PROBLEM SELECTOR PLAN 1
Etiology for pancolitis could be infectious vs inflammatory vs ischemic. Pt denies any history or family history of IBD, and does not have history of autoimmune disease, thus new onset IBD seems less likely. Pt denies any recent exposures that would suggest infectious cause.    Continue ciprofloxacin 400mg q12h and metronidazole   Check stool C diff, stool PCR panel, follow up blood cultures   Start clear liquid diet and advance as tolerated.   Patient already received IV contrast during last CT, will obtain CTA C/A/P in 24 hours

## 2018-06-27 NOTE — ED ADULT NURSE REASSESSMENT NOTE - NS ED NURSE REASSESS COMMENT FT1
Patient told by RN to keep arm straight for abx to infuse. Patient verbalized understanding but non compliant. Flagyl to be held until cipro is finished. MD aware.

## 2018-06-27 NOTE — H&P ADULT - PROBLEM SELECTOR PLAN 8
- start nicotine patch - resume home nexium 20mg PO BID - resume home alprazolam 0.5mg TID  ISTOP reference number: [id number]

## 2018-06-27 NOTE — H&P ADULT - NSHPREVIEWOFSYSTEMS_GEN_ALL_CORE
ROS  Gen:  + subjective fevers, + chills, + fatigue  HEENT: no vision changes, no hearing loss, no pharyngitis, no oral lesions  Pulm: no cough, + mild SOB, no sputum production, no wheezing  Cardiac: no chest pain, no SOB, no orthopnea, + palpitations  GI: + abdominal pain, no N, no V, + diarrhea, no constipation  : no dysuria, no hematuria, no decreased urine output  Skin: no skin lesions  Neuro:  no headache, chronic L upper and lower extremity weakness and paresthesias, no syncope

## 2018-06-27 NOTE — H&P ADULT - PROBLEM SELECTOR PLAN 4
- hold felodipine  - resume enalapril 10mg PO qd  - hold hctz - UCx ordered  - continue IV cipro for now - incidental adrenal nodule found on CT  - check urine metanephrines, dexamethasone suppression test - incidental adrenal nodule found on CT  - will require urine metanephrines, dexamethasone suppression test once acute issues are resolved  - will need outpatient follow up CT

## 2018-06-27 NOTE — ED PROVIDER NOTE - OBJECTIVE STATEMENT
54 yo F history of CAD, carotid stenosis, GERD, gastritis, anxiety, CVA (Dx jan 2018 w/ residual left hand and lower ext weakness), who reports worsening 3 wks of nausea, diarrhea and crampy abdominal pain. She reports worsening diarrhea and abdominal pain over the last few days which prompts today's visit. She denies fevers, chills, chest pain, or shortness of breath.  Abdominal discomfort is somewhat diffuse but more promiment over the right middle abdomen often radiating to her mid to upper abdomen which occurs primarily after meals. She denies recent sick contacts or travel. 52 yo F history of CAD, carotid stenosis, GERD, gastritis, anxiety, CVA (Dx jan 2018 w/ residual left hand and lower ext weakness), who reports worsening 3 wks of nausea, diarrhea and crampy abdominal pain. She reports worsening diarrhea and abdominal pain over the last few days which prompts today's visit. She denies fevers, chills, chest pain, or shortness of breath.  Abdominal discomfort is somewhat diffuse but more prominent over the right middle abdomen often radiating to her mid to upper abdomen which occurs primarily after meals. She denies recent sick contacts or travel.

## 2018-06-27 NOTE — H&P ADULT - PROBLEM SELECTOR PLAN 3
- UCx ordered  - continue IV cipro for now - pt has guaiac positive stool in setting of active colitis  - trend CBC, check type and screen - pt found to have cirrhotic appearance of liver, with elevation of alkaline phosphatase and ALT  - repeat CMP, check hepatic US and hepatitis panel - pt found to have cirrhotic appearance of liver, with elevation of alkaline phosphatase and ALT  - repeat CMP, check and hepatitis panel

## 2018-06-27 NOTE — H&P ADULT - PROBLEM SELECTOR PLAN 5
- resume home alprazolam 0.5mg TID  ISTOP reference number: [id number] Patient endorses history of non-obstructive CAD  - continue aspirin, crestor, enalapril and metoprolol succinate - pt has guaiac positive stool in setting of active colitis  - trend CBC, check type and screen - pt has guaiac positive stool in setting of active colitis  - trend CBC, check type and screen  - consult GI in AM

## 2018-06-27 NOTE — H&P ADULT - NSHPPHYSICALEXAM_GEN_ALL_CORE
Gen: NAD, A&O x 3, lying in bed conversant  HEENT: PERRL, EOMI, mucous membranes moist, no oropharyngeal exudates  Neck:  2 small, mobile posterior cervical lymph nodes,  Pulmonary: CTAB, no rhonci, wheezes or rales  Cardiac: regular rate and rhythm, normal S1S2, no r/m/g  GI:  soft, nondistended, diffusely tender abdomen without rebound or guarding  Extremities: warm, well perfused, no peripheral edema  Skin: skin intact, no skin tears or rashes or other lesions  Neuro: A&O x3, full sensation at face bilaterally, symmetrical smile, tongue midline, decreased sensation at LUE and LLE, 5/5 RUE and RLE strength, 3/5 LUE and LLE strength    Stool guaiac test: POSITIVE Gen: NAD, A&O x 3, lying in bed conversant  HEENT: PERRL, EOMI, mucous membranes moist, no oropharyngeal exudates  Neck:  2 small, mobile posterior cervical lymph nodes,  Pulmonary: CTAB, no rhonci, wheezes or rales  Cardiac: regular rate and rhythm, normal S1S2, no r/m/g  GI:  soft, nondistended, diffusely tender abdomen without rebound or guarding  Extremities: warm, well perfused, no peripheral edema  Skin: skin intact, no skin tears or rashes or other lesions  Neuro: A&O x3, full sensation at face bilaterally, symmetrical smile, tongue midline, decreased sensation at LUE and LLE, 5/5 RUE and RLE strength, 3/5 LUE and LLE strength    Stool guaiac test: POSITIVE  Stool was brown, liquid without sydnee blood

## 2018-06-27 NOTE — H&P ADULT - PROBLEM SELECTOR PLAN 6
- resume home nexium 20mg PO BID - hold felodipine  - resume enalapril 10mg PO qd  - hold hctz - UCx ordered  - continue IV cipro for now

## 2018-06-27 NOTE — ED PROVIDER NOTE - PROGRESS NOTE DETAILS
d/w rads - pancolitis does not appear to be mesenteric ischemia, will admit to medicine for further w/u

## 2018-06-27 NOTE — H&P ADULT - PROBLEM SELECTOR PLAN 9
- venodyne boots  - check for resolution of fecal blood loss - incidental adrenal nodule found on CT  - check urine metanephrines, dexamethasone suppression test - resume home nexium 20mg PO BID - change home nexium 20mg PO BID to pantoprazole 40mg qd

## 2018-06-27 NOTE — ED PROVIDER NOTE - NEURO NEGATIVE STATEMENT, MLM
no loss of consciousness, no gait abnormality, no headache, no sensory deficits, and (+) weakness (chronic, right hand and right lower ext, 2/2 prior CVA)

## 2018-06-27 NOTE — H&P ADULT - PROBLEM SELECTOR PLAN 7
- incidental adrenal nodule found on CT  - check urine metanephrines, dexamethasone suppression test - resume home alprazolam 0.5mg TID  ISTOP reference number: [id number] Patient endorses history of non-obstructive CAD  - continue aspirin, crestor, enalapril and metoprolol succinate  - Can hold felodipine - BP   - hold hctz Patient endorses history of non-obstructive CAD-- unclear history, not previously documented, confirm in AM with pt's cardiologist, Dr. Goff  - continue aspirin, crestor, enalapril and metoprolol succinate  - Can hold felodipine - BP   - hold hctz Patient endorses history of non-obstructive CAD-- unclear history, not previously documented, confirm in AM with pt's cardiologist, Dr. Goff  - continue aspirin, plavix, crestor, enalapril and metoprolol succinate  - Can hold felodipine - BP   - hold hctz

## 2018-06-27 NOTE — H&P ADULT - FAMILY HISTORY
Father  Still living? Unknown  Family history of heart attack, Age at diagnosis: Age Unknown     Mother  Still living? Unknown  Family history of colon cancer, Age at diagnosis: Age Unknown

## 2018-06-27 NOTE — ED PROVIDER NOTE - ATTENDING CONTRIBUTION TO CARE
vasculopath - hx of cva. vague abdominal pain for days/weeks.     mild diffuse ttp wo rebound or guarding. no cvat. pt anxious.     ct abdominal, labs, ua. re-assess.

## 2018-06-27 NOTE — ED ADULT NURSE REASSESSMENT NOTE - NS ED NURSE REASSESS COMMENT FT1
Pt resting in bed denies pin at this time. VSS/ NAD. Daughter at bedside. She has been informed by EM Attending Sage she will be admitted to hospital. Aware of care plan at this time

## 2018-06-27 NOTE — H&P ADULT - ASSESSMENT
Pt is a 53yoF with PMH of GERD, gastritis, htn, carotid stenosis, CVA (Jan 2018, received tPA, residual L sided weakness) s/p loop recorder, anxiety, who presents to the ED with complaint of 3 weeks of intermittent abdominal pain and diarrhea, palpitations and found to have mild pancolitis, with upper pole splenic infarcts, questionable renal infarcts, concerning for potential occult thromboembolic events, with incidental finding of adrenal nodule. Pt is a 53yoF with PMH of GERD, gastritis, htn, carotid stenosis, CVA (Jan 2018, received tPA, residual L sided weakness) s/p loop recorder, anxiety, who presents to the ED with complaint of 3 weeks of intermittent abdominal pain and diarrhea, palpitations and found to have mild pancolitis with positive FOBT, with upper pole splenic infarcts, questionable renal infarcts, concerning for potential occult thromboembolic events, with incidental finding of adrenal nodule.

## 2018-06-27 NOTE — ED PROVIDER NOTE - GASTROINTESTINAL, MLM
Abdomen soft, mild right mid abdominal tenderness, no guarding, rigidity or rebound. No hepatomegaly, no splenomegaly

## 2018-06-27 NOTE — H&P ADULT - NSHPSOCIALHISTORY_GEN_ALL_CORE
Pt has been smoking for 35 years, recently decreased from 1 pack daily to 7-8 cigarettes daily.  She denies recent alcohol use.   Pt smokes marijuana. She has distant history of cocaine use. No history of IVDU.  Pt lives with her sister. She is applying for [employer].y

## 2018-06-27 NOTE — H&P ADULT - HISTORY OF PRESENT ILLNESS
Pt is a 53yoF with PMH of GERD, gastritis, htn, carotid stenosis, CVA (Jan 2018, received tPA, residual L sided weakness) s/p loop recorder, anxiety, who presents to the ED with complaint of 3 weeks of intermittent abdominal pain. Abdominal pain is diffuse, starts spontaneously, lasts several hours then improves but does not completely remit. Pt describes the pain as being cramping in quality, with associated bloating. Abdominal pain is somewhat worsened after taking PO. Pt also endorses persistent diarrhea over the last 3 weeks, with 2-3 liquid bowel movements daily, and passage of blood. She also complains of associated subjective fever, chills and palpitations. She denies any recent sick contacts or travel.   Pt states that she had a colonoscopy 7 years ago, which was  reportedly unremarkable. Pt is a 53yoF with PMH of GERD, gastritis, htn, carotid stenosis, CVA (Jan 2018, received tPA, residual L sided weakness) s/p loop recorder, anxiety, who presents to the ED with complaint of 3 weeks of intermittent abdominal pain. Abdominal pain is diffuse, starts spontaneously, lasts several hours then improves but does not completely remit. Pt describes the pain as being cramping in quality, with associated bloating. Abdominal pain is somewhat worsened after taking PO. Pt also endorses persistent diarrhea over the last 3 weeks, with 2-3 liquid bowel movements daily, and passage of blood. She also complains of associated subjective fever, chills and palpitations. She denies any recent sick contacts or travel.   Pt states that she had a colonoscopy 7 years ago, which was  reportedly unremarkable.     Of note, patient's medication reconciliation lists carbamazepine, which pt states had been started for mood disorder, however pt had self-discontinued this medication 1 month ago.     VS T 97.6, HR 74, /87, RR 18, SpO2 97%RA

## 2018-06-27 NOTE — CONSULT NOTE ADULT - SUBJECTIVE AND OBJECTIVE BOX
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 She reports worsening diarrhea and abdominal pain over the last few days which prompts today's visit. She denies fevers, chills, chest pain, or shortness of breath.  Abdominal discomfort is somewhat diffuse but more prominent over the right middle abdomen often radiating to her mid to upper abdomen which occurs primarily after meals. She denies recent sick contacts or travel.	        PAST MEDICAL & SURGICAL HISTORY:  Vocal Cord Disease  Constipation  Anxiety  Nephrolithiasis  GERD (Gastroesophageal Reflux Disease)  Hypertension  Lesion of vocal cord  H/O:       MEDICATIONS  (STANDING):  ciprofloxacin   IVPB 400 milliGRAM(s) IV Intermittent every 12 hours  metroNIDAZOLE  IVPB 500 milliGRAM(s) IV Intermittent Once    MEDICATIONS  (PRN):      FAMILY HISTORY:  Family history of heart attack (Father)      SOCIAL HISTORY:    [ ] Non-smoker  [ ] Smoker  [ ] Alcohol    Allergies    No Known Allergies    Intolerances    	    REVIEW OF SYSTEMS:  CONSTITUTIONAL: No fever, weight loss, or fatigue  EYES: No eye pain, visual disturbances, or discharge  ENT:  No difficulty hearing, tinnitus, vertigo; No sinus or throat pain  NECK: No pain or stiffness  RESPIRATORY: No cough, wheezing, chills or hemoptysis; No Shortness of Breath  CARDIOVASCULAR: No chest pain, palpitations, passing out, dizziness, or leg swelling  GASTROINTESTINAL: No abdominal or epigastric pain. No nausea, vomiting, or hematemesis; No diarrhea or constipation. No melena or hematochezia.  GENITOURINARY: No dysuria, frequency, hematuria, or incontinence  NEUROLOGICAL: No headaches, memory loss, loss of strength, numbness, or tremors  SKIN: No itching, burning, rashes, or lesions   LYMPH Nodes: No enlarged glands  ENDOCRINE: No heat or cold intolerance; No hair loss  MUSCULOSKELETAL: No joint pain or swelling; No muscle, back, or extremity pain  PSYCHIATRIC: No depression, anxiety, mood swings, or difficulty sleeping  HEME/LYMPH: No easy bruising, or bleeding gums  ALLERGY AND IMMUNOLOGIC: No hives or eczema	    [ ] All others negative	  [ ] Unable to obtain    PHYSICAL EXAM:  T(C): 36.4 (06-27-18 @ 10:49), Max: 36.5 (18 @ 10:29)  HR: 74 (18 @ 15:42) (69 - 75)  BP: 138/87 (18 @ 15:42) (91/66 - 138/87)  RR: 18 (18 @ 15:42) (17 - 19)  SpO2: 97% (18 @ 15:42) (97% - 99%)  Wt(kg): --  I&O's Summary      Appearance: Normal	  HEENT:   Normal oral mucosa, PERRL, EOMI	  Lymphatic: No lymphadenopathy  Cardiovascular: Normal S1 S2, No JVD, N+murmurs, No edema  Respiratory: Lungs clear to auscultation	  Psychiatry: A & O x 3, Mood & affect appropriate  Gastrointestinal:  Soft, Non-tender, + BS	  Skin: No rashes, No ecchymoses, No cyanosis	  Neurologic: Non-focal  Extremities: Normal range of motion, No clubbing, cyanosis or edema  Vascular: Peripheral pulses palpable 2+ bilaterally    TELEMETRY: 	    ECG:  	  RADIOLOGY:  OTHER: 	  	  LABS:	 	    CARDIAC MARKERS:                              15.6   15.2  )-----------( 485      ( 2018 11:30 )             47.1         138  |  98  |  24<H>  ----------------------------<  107<H>  3.7   |  25  |  1.17    Ca    10.5      2018 11:30    TPro  7.9  /  Alb  4.6  /  TBili  0.3  /  DBili  x   /  AST  37  /  ALT  53<H>  /  AlkPhos  125<H>      proBNP:   Lipid Profile:   HgA1c:   TSH:   PT/INR - ( 2018 11:30 )   PT: 10.5 sec;   INR: 0.97 ratio         PTT - ( 2018 11:30 )  PTT:26.3 sec    PREVIOUS DIAGNOSTIC TESTING:     < from: CT Abdomen and Pelvis w/ Oral Cont and w/ IV Cont (18 @ 14:14) >  Mild pancolitis. Questionable inflammation of the terminal ileum.    Upper pole splenic infarcts.    Bilateral irregular cortical hypodense areas in the kidneys of uncertain   etiology; possibly small infarcts or pyelonephritis.    < end of copied text >

## 2018-06-27 NOTE — ED PROVIDER NOTE - CARE PLAN
Principal Discharge DX:	Pancolitis  Secondary Diagnosis:	Renal infarct  Secondary Diagnosis:	Splenic infarct

## 2018-06-28 LAB
ALBUMIN SERPL ELPH-MCNC: 3.7 G/DL — SIGNIFICANT CHANGE UP (ref 3.3–5)
ALP SERPL-CCNC: 104 U/L — SIGNIFICANT CHANGE UP (ref 40–120)
ALT FLD-CCNC: 36 U/L — SIGNIFICANT CHANGE UP (ref 10–45)
ANION GAP SERPL CALC-SCNC: 15 MMOL/L — SIGNIFICANT CHANGE UP (ref 5–17)
AST SERPL-CCNC: 18 U/L — SIGNIFICANT CHANGE UP (ref 10–40)
BILIRUB SERPL-MCNC: 0.2 MG/DL — SIGNIFICANT CHANGE UP (ref 0.2–1.2)
BLD GP AB SCN SERPL QL: NEGATIVE — SIGNIFICANT CHANGE UP
BUN SERPL-MCNC: 21 MG/DL — SIGNIFICANT CHANGE UP (ref 7–23)
CALCIUM SERPL-MCNC: 10 MG/DL — SIGNIFICANT CHANGE UP (ref 8.4–10.5)
CHLORIDE SERPL-SCNC: 98 MMOL/L — SIGNIFICANT CHANGE UP (ref 96–108)
CO2 SERPL-SCNC: 24 MMOL/L — SIGNIFICANT CHANGE UP (ref 22–31)
CREAT SERPL-MCNC: 1.17 MG/DL — SIGNIFICANT CHANGE UP (ref 0.5–1.3)
GLUCOSE SERPL-MCNC: 94 MG/DL — SIGNIFICANT CHANGE UP (ref 70–99)
HCT VFR BLD CALC: 39.9 % — SIGNIFICANT CHANGE UP (ref 34.5–45)
HGB BLD-MCNC: 12.7 G/DL — SIGNIFICANT CHANGE UP (ref 11.5–15.5)
INR BLD: 0.97 RATIO — SIGNIFICANT CHANGE UP (ref 0.88–1.16)
MAGNESIUM SERPL-MCNC: 1.8 MG/DL — SIGNIFICANT CHANGE UP (ref 1.6–2.6)
MCHC RBC-ENTMCNC: 31 PG — SIGNIFICANT CHANGE UP (ref 27–34)
MCHC RBC-ENTMCNC: 31.8 GM/DL — LOW (ref 32–36)
MCV RBC AUTO: 97.5 FL — SIGNIFICANT CHANGE UP (ref 80–100)
PHOSPHATE SERPL-MCNC: 3.8 MG/DL — SIGNIFICANT CHANGE UP (ref 2.5–4.5)
PLATELET # BLD AUTO: 360 K/UL — SIGNIFICANT CHANGE UP (ref 150–400)
POTASSIUM SERPL-MCNC: 3.5 MMOL/L — SIGNIFICANT CHANGE UP (ref 3.5–5.3)
POTASSIUM SERPL-SCNC: 3.5 MMOL/L — SIGNIFICANT CHANGE UP (ref 3.5–5.3)
PROT SERPL-MCNC: 6.7 G/DL — SIGNIFICANT CHANGE UP (ref 6–8.3)
PROTHROM AB SERPL-ACNC: 10.5 SEC — SIGNIFICANT CHANGE UP (ref 9.8–12.7)
RBC # BLD: 4.09 M/UL — SIGNIFICANT CHANGE UP (ref 3.8–5.2)
RBC # FLD: 12 % — SIGNIFICANT CHANGE UP (ref 10.3–14.5)
RH IG SCN BLD-IMP: POSITIVE — SIGNIFICANT CHANGE UP
SODIUM SERPL-SCNC: 137 MMOL/L — SIGNIFICANT CHANGE UP (ref 135–145)
WBC # BLD: 5.7 K/UL — SIGNIFICANT CHANGE UP (ref 3.8–10.5)
WBC # FLD AUTO: 5.7 K/UL — SIGNIFICANT CHANGE UP (ref 3.8–10.5)

## 2018-06-28 PROCEDURE — 99222 1ST HOSP IP/OBS MODERATE 55: CPT | Mod: GC

## 2018-06-28 PROCEDURE — 74174 CTA ABD&PLVS W/CONTRAST: CPT | Mod: 26

## 2018-06-28 RX ORDER — SIMETHICONE 80 MG/1
80 TABLET, CHEWABLE ORAL
Qty: 0 | Refills: 0 | Status: DISCONTINUED | OUTPATIENT
Start: 2018-06-28 | End: 2018-07-01

## 2018-06-28 RX ORDER — OXYCODONE AND ACETAMINOPHEN 5; 325 MG/1; MG/1
1 TABLET ORAL EVERY 4 HOURS
Qty: 0 | Refills: 0 | Status: DISCONTINUED | OUTPATIENT
Start: 2018-06-28 | End: 2018-07-01

## 2018-06-28 RX ORDER — NICOTINE POLACRILEX 2 MG
1 GUM BUCCAL DAILY
Qty: 0 | Refills: 0 | Status: DISCONTINUED | OUTPATIENT
Start: 2018-06-28 | End: 2018-07-01

## 2018-06-28 RX ORDER — ACETAMINOPHEN 500 MG
650 TABLET ORAL ONCE
Qty: 0 | Refills: 0 | Status: COMPLETED | OUTPATIENT
Start: 2018-06-28 | End: 2018-06-28

## 2018-06-28 RX ADMIN — PANTOPRAZOLE SODIUM 40 MILLIGRAM(S): 20 TABLET, DELAYED RELEASE ORAL at 05:00

## 2018-06-28 RX ADMIN — Medication 100 MILLIGRAM(S): at 22:11

## 2018-06-28 RX ADMIN — Medication 200 MILLIGRAM(S): at 05:01

## 2018-06-28 RX ADMIN — Medication 100 MILLIGRAM(S): at 05:01

## 2018-06-28 RX ADMIN — Medication 10 MILLIGRAM(S): at 05:00

## 2018-06-28 RX ADMIN — Medication 1 SPRAY(S): at 10:24

## 2018-06-28 RX ADMIN — Medication 10 MILLIGRAM(S): at 17:14

## 2018-06-28 RX ADMIN — Medication 1 GRAM(S): at 17:14

## 2018-06-28 RX ADMIN — Medication 650 MILLIGRAM(S): at 13:30

## 2018-06-28 RX ADMIN — SPIRONOLACTONE 25 MILLIGRAM(S): 25 TABLET, FILM COATED ORAL at 17:13

## 2018-06-28 RX ADMIN — SPIRONOLACTONE 25 MILLIGRAM(S): 25 TABLET, FILM COATED ORAL at 05:00

## 2018-06-28 RX ADMIN — CLOPIDOGREL BISULFATE 75 MILLIGRAM(S): 75 TABLET, FILM COATED ORAL at 09:03

## 2018-06-28 RX ADMIN — Medication 1 PATCH: at 09:03

## 2018-06-28 RX ADMIN — OXYCODONE AND ACETAMINOPHEN 1 TABLET(S): 5; 325 TABLET ORAL at 18:00

## 2018-06-28 RX ADMIN — Medication 650 MILLIGRAM(S): at 12:55

## 2018-06-28 RX ADMIN — Medication 0.5 MILLIGRAM(S): at 00:56

## 2018-06-28 RX ADMIN — OXYCODONE AND ACETAMINOPHEN 1 TABLET(S): 5; 325 TABLET ORAL at 22:40

## 2018-06-28 RX ADMIN — Medication 0.5 MILLIGRAM(S): at 22:11

## 2018-06-28 RX ADMIN — Medication 81 MILLIGRAM(S): at 09:03

## 2018-06-28 RX ADMIN — ROSUVASTATIN CALCIUM 10 MILLIGRAM(S): 5 TABLET ORAL at 22:11

## 2018-06-28 RX ADMIN — OXYCODONE AND ACETAMINOPHEN 1 TABLET(S): 5; 325 TABLET ORAL at 22:11

## 2018-06-28 RX ADMIN — OXYCODONE AND ACETAMINOPHEN 1 TABLET(S): 5; 325 TABLET ORAL at 17:14

## 2018-06-28 RX ADMIN — Medication 1 GRAM(S): at 05:00

## 2018-06-28 RX ADMIN — Medication 0.5 MILLIGRAM(S): at 13:02

## 2018-06-28 RX ADMIN — Medication 100 MILLIGRAM(S): at 13:02

## 2018-06-28 RX ADMIN — Medication 200 MILLIGRAM(S): at 17:14

## 2018-06-28 NOTE — CONSULT NOTE ADULT - SUBJECTIVE AND OBJECTIVE BOX
Patient is a 53y old  Female who presents with a chief complaint of abdominal pain (2018 21:14)      HPI:  Pt is a 53F with PMH of GERD, gastritis, htn, carotid stenosis, HTN, CVA (2018, received tPA, residual left sided weakness, on ASA and Plavix , has a  loop recorder, She presents to the ED 18 with complaint of 3 weeks of intermittent abdominal pain. Abdominal pain was diffuse, seemed to get better then recurred  Pt says the pain is crampy. She reports she had diarrhea yesterday and noticed some blood with the bowel movement . She reports reflux and feeling bloated . She was advised  to take PPI, and Carafate. She reports the Nexium was not covered on her insurance .She reports she vomited yesterday and noted some blood with the vomitus. She reports family hx of colon cancer ( mother) reporting her last colonoscopy was 7 years ago.        VS T 97.6, HR 74, /87, RR 18, SpO2 97%RA (2018 21:14)      PAST MEDICAL & SURGICAL HISTORY:  Vocal Cord Disease  Constipation  Anxiety  Nephrolithiasis  GERD (Gastroesophageal Reflux Disease)  Hypertension  Lesion of vocal cord  H/O:       Allergies    No Known Allergies    Intolerances        MEDICATIONS  (STANDING):  aspirin enteric coated 81 milliGRAM(s) Oral daily  ciprofloxacin   IVPB 400 milliGRAM(s) IV Intermittent every 12 hours  clopidogrel Tablet 75 milliGRAM(s) Oral daily  enalapril 10 milliGRAM(s) Oral two times a day  fluticasone propionate 50 MICROgram(s)/spray Nasal Spray 1 Spray(s) Both Nostrils daily  metoprolol succinate  milliGRAM(s) Oral daily  metroNIDAZOLE  IVPB 500 milliGRAM(s) IV Intermittent every 8 hours  nicotine -  14 mG/24Hr(s) Patch 1 patch Transdermal daily  pantoprazole    Tablet 40 milliGRAM(s) Oral before breakfast  rosuvastatin 10 milliGRAM(s) Oral at bedtime  sodium chloride 0.9%. 1000 milliLiter(s) (100 mL/Hr) IV Continuous <Continuous>  spironolactone 25 milliGRAM(s) Oral two times a day  sucralfate 1 Gram(s) Oral two times a day    MEDICATIONS  (PRN):  ALPRAZolam 0.5 milliGRAM(s) Oral three times a day PRN anxiety  oxyCODONE    5 mG/acetaminophen 325 mG 1 Tablet(s) Oral every 4 hours PRN Moderate Pain (4 - 6)/Severe Pain (7 - 10)          Review of Systems:    General:  No fevers or chills ,   CV:  No chest pain  Resp:  No dyspnea, cough, or  wheezing  GI:  abdominal discomfort / has improved but not resolved , loose stools yesterday , patient reported with some blood   :  reports  hematuria   Skin:  No jaundice     Vital Signs Last 24 Hrs  T(C): 36.5 (2018 14:51), Max: 37.1 (2018 20:30)  T(F): 97.7 (2018 14:51), Max: 98.7 (2018 20:30)  HR: 69 (2018 14:51) (68 - 71)  BP: 102/68 (2018 14:51) (100/66 - 150/83)  BP(mean): --  RR: 19 (2018 14:51) (18 - 19)  SpO2: 95% (2018 14:51) (95% - 100%)    PHYSICAL EXAM:    Constitutional: non toxic ,NAD, well-developed  Neck: supple  Respiratory: CTA and P  Cardiovascular: S1 and S2, RRR,  Gastrointestinal: BS+, soft, no acute tenderness   Extremities: No peripheral edema  Psychiatric: Normal mood, normal affect  Skin: No visible rashes        LABS:                        12.7   5.7   )-----------( 360      ( 2018 06:38 )             39.9     06-28    137  |  98  |  21  ----------------------------<  94  3.5   |  24  |  1.17    Ca    10.0      2018 06:52  Phos  3.8     06-28  Mg     1.8     06-28    TPro  6.7  /  Alb  3.7  /  TBili  0.2  /  DBili  x   /  AST  18  /  ALT  36  /  AlkPhos  104  06-28    PT/INR - ( 2018 06:38 )   PT: 10.5 sec;   INR: 0.97 ratio         PTT - ( 2018 11:30 )  PTT:26.3 sec  Urinalysis Basic - ( 2018 14:04 )    Color: Red / Appearance: Turbid / S.021 / pH: x  Gluc: x / Ketone: Negative  / Bili: Negative / Urobili: Negative   Blood: x / Protein: 30 mg/dL / Nitrite: Negative   Leuk Esterase: Large / RBC: x / WBC 2-5 /HPF   Sq Epi: x / Non Sq Epi: Occasional /HPF / Bacteria: Moderate /HPF      LIVER FUNCTIONS - ( 2018 06:52 )  Alb: 3.7 g/dL / Pro: 6.7 g/dL / ALK PHOS: 104 U/L / ALT: 36 U/L / AST: 18 U/L / GGT: x             RADIOLOGY & ADDITIONAL TESTS:   CT Abdomen and Pelvis w/ Oral Cont and w/ IV Cont (18 @ 14:14) >    EXAM:  CT ABDOMEN AND PELVIS OC IC                            PROCEDURE DATE:  2018    INTERPRETATION:  CLINICAL INFORMATION: Abdominal pain left lower quadrant   pain with bloody diarrhea for one day    COMPARISON: MR of the abdomen with and without IV contrast 10/25/2010    PROCEDURE:   CT of the Abdomen and Pelvis was performed with intravenous contrast.   Intravenous contrast: 90 ml Omnipaque 350. 10 ml discarded.  Oral contrast: positive contrast was administered.  Sagittal and coronal reformats were performed.    FINDINGS:    LOWER CHEST: Within normal limits.    LIVER: Cirrhotic liver morphology and hepatomegaly.  BILE DUCTS: The common bile duct is dilated measuring up to 1.0 cm.  GALLBLADDER: Cholecystectomy.  SPLEEN: Focal wedge-shaped hypodensities within the spleen consistent   with splenic infarct.  PANCREAS: Within normal limits.  ADRENALS: Right-sided adrenal nodule measuring 2.1 x 1.7 cm.  KIDNEYS/URETERS: Bilateral irregular cortical hypodensities.     BLADDER: Within normal limits.  REPRODUCTIVE ORGANS: The uterus is within normal limits. There is a 3.6   cm right-sided ovarian cyst.    BOWEL: No bowel obstruction. Mild pancolonic thickening. Questionable   inflammation of the terminal ileum. Appendix is normal. Duodenal   diverticulum.  PERITONEUM: No ascites.  VESSELS:  Atherosclerotic changes.  RETROPERITONEUM: No lymphadenopathy.    ABDOMINAL WALL: Within normal limits.  BONES: Degenerative changes of the spine.     IMPRESSION:   Mild pancolitis. Questionable inflammation of the terminal ileum.  Upper pole splenic infarcts.    Bilateral irregular cortical hypodense areas in the kidneys of uncertain   etiology; possibly small infarcts or pyelonephritis.  < from: CT Angio Abdomen and Pelvis w/ IV Cont (18 @ 10:23) >  EXAM:  CT ANGIO ABD PELV (W)AW IC                                PROCEDURE DATE:  2018    INTERPRETATION:  CLINICAL INFORMATION: Diarrhea for 3 weeks. Recent CT   shows colitis with splenic infarcts and possible renal infarcts.    COMPARISON: CT abdomen pelvis from 2018    PROCEDURE:   CT Angiography of the Abdomen and Pelvis.   Images were obtained in the arterial phase and venous phases.  Intravenous contrast: 90 ml Omnipaque 350. 10 ml discarded.  Oral contrast: None.  Sagittal and coronal reformats were performed as well as MIPS.    FINDINGS:    LOWER CHEST: Within normal limits.    LIVER: Within normal limits.  BILE DUCTS: Pneumobilia   GALLBLADDER: Cholecystectomy.  SPLEEN: Small foci of hypoenhancement in the superior aspect of the   spleen, decreased in size from prior study. Splenule.  PANCREAS: Within normal limits.  ADRENALS: Unchanged 1.7 cm right adrenal nodule.  KIDNEYS/URETERS: Normal arterial phase renal enhancement. Redemonstrated   cortical hypoenhancement on venous phase.    BLADDER: Within normal limits.  REPRODUCTIVE ORGANS:  Unchanged 3.6 cm right ovarian cyst. The uterus and   adnexa are otherwise within normal limits     BOWEL: Improved pancolonic wall thickening. Duodenal diverticulum. No   bowel obstruction. Appendix normal.  PERITONEUM: No ascites.  VESSELS:  No major vessel occlusion or high-grade stenosis. Mild aortic   atherosclerosis. Mild to moderate celiac axis stenosis.  RETROPERITONEUM: No lymphadenopathy.    ABDOMINAL WALL: Within normal limits.  BONES: Within normal limits.    IMPRESSION: Patent abdominal vasculature.     Improved pancolitis.    Small areas of venous hypoenhancement in the superior pole of the spleen,   suggestive of infarct, decreased in size from prior study.    No renal infarcts. Patient is a 53y old  Female who presented with a chief complaint of abdominal pain (2018 21:14)    HPI:  Pt is a 53F with PMH of GERD, gastritis, htn, carotid stenosis, HTN, CVA (2018, received tPA, residual left sided weakness, on ASA and Plavix , has a  loop recorder, She presents to the ED 18 with complaint of 3 weeks of intermittent abdominal pain. Abdominal pain was diffuse, seemed to get better then recurred  Pt says the pain is crampy. She reports she had diarrhea yesterday and noticed some blood with the bowel movement . She reports reflux and feeling bloated . She was advised  to take PPI, and Carafate. She reports the Nexium was not covered on her insurance .She reports she vomited yesterday and noted some blood with the vomitus. She reports family hx of colon cancer ( mother) reporting her last colonoscopy was 7 years ago.      PAST MEDICAL & SURGICAL HISTORY:  Vocal Cord Disease  Constipation  Anxiety  Nephrolithiasis  GERD (Gastroesophageal Reflux Disease)  Hypertension  Lesion of vocal cord  H/O:     Allergies  No Known Allergies    MEDICATIONS  (STANDING):  aspirin enteric coated 81 milliGRAM(s) Oral daily  ciprofloxacin   IVPB 400 milliGRAM(s) IV Intermittent every 12 hours  clopidogrel Tablet 75 milliGRAM(s) Oral daily  enalapril 10 milliGRAM(s) Oral two times a day  fluticasone propionate 50 MICROgram(s)/spray Nasal Spray 1 Spray(s) Both Nostrils daily  metoprolol succinate  milliGRAM(s) Oral daily  metroNIDAZOLE  IVPB 500 milliGRAM(s) IV Intermittent every 8 hours  nicotine -  14 mG/24Hr(s) Patch 1 patch Transdermal daily  pantoprazole    Tablet 40 milliGRAM(s) Oral before breakfast  rosuvastatin 10 milliGRAM(s) Oral at bedtime  sodium chloride 0.9%. 1000 milliLiter(s) (100 mL/Hr) IV Continuous <Continuous>  spironolactone 25 milliGRAM(s) Oral two times a day  sucralfate 1 Gram(s) Oral two times a day    MEDICATIONS  (PRN):  ALPRAZolam 0.5 milliGRAM(s) Oral three times a day PRN anxiety  oxyCODONE    5 mG/acetaminophen 325 mG 1 Tablet(s) Oral every 4 hours PRN Moderate Pain (4 - 6)/Severe Pain (7 - 10)    Review of Systems:  General:  No fevers or chills ,   CV:  No chest pain  Resp:  No dyspnea, cough, or  wheezing  GI:  abdominal discomfort / has improved but not resolved , loose stools yesterday , patient reported with some blood   :  reports  hematuria   Skin:  No jaundice     Vital Signs Last 24 Hrs  T(C): 36.5 (2018 14:51), Max: 37.1 (2018 20:30)  T(F): 97.7 (2018 14:51), Max: 98.7 (2018 20:30)  HR: 69 (2018 14:51) (68 - 71)  BP: 102/68 (2018 14:51) (100/66 - 150/83)  BP(mean): --  RR: 19 (2018 14:51) (18 - 19)  SpO2: 95% (2018 14:51) (95% - 100%)    PHYSICAL EXAM:  Constitutional: non toxic ,NAD, well-developed  Neck: supple  Respiratory: CTA and P  Cardiovascular: S1 and S2, RRR,  Gastrointestinal: BS+, soft, no acute tenderness   Extremities: No peripheral edema  Psychiatric: Normal mood, normal affect  Skin: No visible rashes    LABS:                      12.7   5.7   )-----------( 360      ( 2018 06:38 )             39.9     -    137  |  98  |  21  ----------------------------<  94  3.5   |  24  |  1.17    Ca    10.0      2018 06:52  Phos  3.8     -  Mg     1.8     -28    TPro  6.7  /  Alb  3.7  /  TBili  0.2  /  DBili  x   /  AST  18  /  ALT  36  /  AlkPhos  104  06-28    PT/INR - ( 2018 06:38 )   PT: 10.5 sec;   INR: 0.97 ratio    PTT - ( 2018 11:30 )  PTT:26.3 sec    Urinalysis Basic - ( 2018 14:04 )  Color: Red / Appearance: Turbid / S.021 / pH: x  Gluc: x / Ketone: Negative  / Bili: Negative / Urobili: Negative   Blood: x / Protein: 30 mg/dL / Nitrite: Negative   Leuk Esterase: Large / RBC: x / WBC 2-5 /HPF   Sq Epi: x / Non Sq Epi: Occasional /HPF / Bacteria: Moderate /HPF    RADIOLOGY & ADDITIONAL TESTS:   CT Abdomen and Pelvis w/ Oral Cont and w/ IV Cont (18 @ 14:14) >    EXAM:  CT ABDOMEN AND PELVIS OC IC                            PROCEDURE DATE:  2018    INTERPRETATION:  CLINICAL INFORMATION: Abdominal pain left lower quadrant   pain with bloody diarrhea for one day    COMPARISON: MR of the abdomen with and without IV contrast 10/25/2010    PROCEDURE:   CT of the Abdomen and Pelvis was performed with intravenous contrast.   Intravenous contrast: 90 ml Omnipaque 350. 10 ml discarded.  Oral contrast: positive contrast was administered.  Sagittal and coronal reformats were performed.    FINDINGS:    LOWER CHEST: Within normal limits.    LIVER: Cirrhotic liver morphology and hepatomegaly.  BILE DUCTS: The common bile duct is dilated measuring up to 1.0 cm.  GALLBLADDER: Cholecystectomy.  SPLEEN: Focal wedge-shaped hypodensities within the spleen consistent   with splenic infarct.  PANCREAS: Within normal limits.  ADRENALS: Right-sided adrenal nodule measuring 2.1 x 1.7 cm.  KIDNEYS/URETERS: Bilateral irregular cortical hypodensities.     BLADDER: Within normal limits.  REPRODUCTIVE ORGANS: The uterus is within normal limits. There is a 3.6   cm right-sided ovarian cyst.    BOWEL: No bowel obstruction. Mild pancolonic thickening. Questionable   inflammation of the terminal ileum. Appendix is normal. Duodenal   diverticulum.  PERITONEUM: No ascites.  VESSELS:  Atherosclerotic changes.  RETROPERITONEUM: No lymphadenopathy.    ABDOMINAL WALL: Within normal limits.  BONES: Degenerative changes of the spine.     IMPRESSION:   Mild pancolitis. Questionable inflammation of the terminal ileum.  Upper pole splenic infarcts.    Bilateral irregular cortical hypodense areas in the kidneys of uncertain   etiology; possibly small infarcts or pyelonephritis.  < from: CT Angio Abdomen and Pelvis w/ IV Cont (18 @ 10:23) >  EXAM:  CT ANGIO ABD PELV (W)AW IC                                PROCEDURE DATE:  2018    INTERPRETATION:  CLINICAL INFORMATION: Diarrhea for 3 weeks. Recent CT   shows colitis with splenic infarcts and possible renal infarcts.    COMPARISON: CT abdomen pelvis from 2018    PROCEDURE:   CT Angiography of the Abdomen and Pelvis.   Images were obtained in the arterial phase and venous phases.  Intravenous contrast: 90 ml Omnipaque 350. 10 ml discarded.  Oral contrast: None.  Sagittal and coronal reformats were performed as well as MIPS.    FINDINGS:    LOWER CHEST: Within normal limits.    LIVER: Within normal limits.  BILE DUCTS: Pneumobilia   GALLBLADDER: Cholecystectomy.  SPLEEN: Small foci of hypoenhancement in the superior aspect of the   spleen, decreased in size from prior study. Splenule.  PANCREAS: Within normal limits.  ADRENALS: Unchanged 1.7 cm right adrenal nodule.  KIDNEYS/URETERS: Normal arterial phase renal enhancement. Redemonstrated   cortical hypoenhancement on venous phase.    BLADDER: Within normal limits.  REPRODUCTIVE ORGANS:  Unchanged 3.6 cm right ovarian cyst. The uterus and   adnexa are otherwise within normal limits     BOWEL: Improved pancolonic wall thickening. Duodenal diverticulum. No   bowel obstruction. Appendix normal.  PERITONEUM: No ascites.  VESSELS:  No major vessel occlusion or high-grade stenosis. Mild aortic   atherosclerosis. Mild to moderate celiac axis stenosis.  RETROPERITONEUM: No lymphadenopathy.    ABDOMINAL WALL: Within normal limits.  BONES: Within normal limits.    IMPRESSION: Patent abdominal vasculature.     Improved pancolitis.    Small areas of venous hypoenhancement in the superior pole of the spleen,   suggestive of infarct, decreased in size from prior study.    No renal infarcts.

## 2018-06-28 NOTE — CONSULT NOTE ADULT - ASSESSMENT
53yoF with medical history of GERD, gastritis, htn, carotid stenosis, CVA (Jan 2018, received tPA, residual L sided weakness) s/p loop recorder, anxiety, who presents to the ED with complaint of 3 weeks of intermittent abdominal pain and is currently being managed for pancolitis, splenic infarct found incidentally on CT A/P. Hematology consulted for hypercoaguable work-up.    #Splenic infarct:  - of unknown etiology, repeat CT A/P showing decreased size of infarct  - can send APS labs: lupus anticoagulant, anticardiolipin antibodies, beta 2 glycoprotein antibodies; factor V leiden mutation, prothrombin gene mutation.  - do not recommend anti-coagulation at this point based on splenic infarct of unknown significance

## 2018-06-28 NOTE — CONSULT NOTE ADULT - SUBJECTIVE AND OBJECTIVE BOX
Hematology Consult Note    HPI:  Pt is a 53yoF with PMH of GERD, gastritis, htn, carotid stenosis, CVA (2018, received tPA, residual L sided weakness) s/p loop recorder, anxiety, who presents to the ED with complaint of 3 weeks of intermittent abdominal pain. Abdominal pain is diffuse, starts spontaneously, lasts several hours then improves but does not completely remit. Pt describes the pain as being cramping in quality, with associated bloating. Abdominal pain is somewhat worsened after taking PO. Pt also endorses persistent diarrhea over the last 3 weeks, with 2-3 liquid bowel movements daily, and passage of blood. She also complains of associated subjective fever, chills and palpitations. She denies any recent sick contacts or travel.   Pt states that she had a colonoscopy 7 years ago, which was reportedly unremarkable.     Of note, patient's medication reconciliation lists carbamazepine, which pt states had been started for mood disorder, however pt had self-discontinued this medication 1 month ago.     VS T 97.6, HR 74, /87, RR 18, SpO2 97%RA     Allergies    No Known Allergies    Intolerances        MEDICATIONS  (STANDING):  aspirin enteric coated 81 milliGRAM(s) Oral daily  ciprofloxacin   IVPB 400 milliGRAM(s) IV Intermittent every 12 hours  clopidogrel Tablet 75 milliGRAM(s) Oral daily  enalapril 10 milliGRAM(s) Oral two times a day  fluticasone propionate 50 MICROgram(s)/spray Nasal Spray 1 Spray(s) Both Nostrils daily  metoprolol succinate  milliGRAM(s) Oral daily  metroNIDAZOLE  IVPB 500 milliGRAM(s) IV Intermittent every 8 hours  nicotine -  14 mG/24Hr(s) Patch 1 patch Transdermal daily  pantoprazole    Tablet 40 milliGRAM(s) Oral before breakfast  rosuvastatin 10 milliGRAM(s) Oral at bedtime  sodium chloride 0.9%. 1000 milliLiter(s) (100 mL/Hr) IV Continuous <Continuous>  spironolactone 25 milliGRAM(s) Oral two times a day  sucralfate 1 Gram(s) Oral two times a day    MEDICATIONS  (PRN):  ALPRAZolam 0.5 milliGRAM(s) Oral three times a day PRN anxiety  oxyCODONE    5 mG/acetaminophen 325 mG 1 Tablet(s) Oral every 4 hours PRN Moderate Pain (4 - 6)/Severe Pain (7 - 10)      PAST MEDICAL & SURGICAL HISTORY:  Vocal Cord Disease  Constipation  Anxiety  Nephrolithiasis  GERD (Gastroesophageal Reflux Disease)  Hypertension  Lesion of vocal cord  H/O:       FAMILY HISTORY:  Family history of colon cancer (Mother)  Family history of heart attack (Father)      SOCIAL HISTORY: No EtOH, no tobacco    REVIEW OF SYSTEMS:    CONSTITUTIONAL: No weakness, fevers or chills  EYES/ENT: No visual changes;  No vertigo or throat pain   NECK: No pain or stiffness  RESPIRATORY: No cough, wheezing, hemoptysis; No shortness of breath  CARDIOVASCULAR: No chest pain or palpitations  GASTROINTESTINAL: No abdominal or epigastric pain. No nausea, vomiting, or hematemesis; No diarrhea or constipation. No melena or hematochezia.  GENITOURINARY: No dysuria, frequency or hematuria  NEUROLOGICAL: No numbness or weakness  SKIN: No itching, burning, rashes, or lesions   All other review of systems is negative unless indicated above.      Weight (kg): 59.5 ( @ 23:30)    T(F): 97.7 (18 @ 14:51), Max: 98.7 (18 @ 20:30)  HR: 67 (18 @ 17:15)  BP: 119/81 (18 @ 17:15)  RR: 19 (18 @ 14:51)  SpO2: 95% (18 @ 14:51)  Wt(kg): --    GENERAL: NAD, well-developed  HEAD:  Atraumatic, Normocephalic  EYES: EOMI, PERRLA, conjunctiva and sclera clear  NECK: Supple, No JVD  CHEST/LUNG: Clear to auscultation bilaterally; No wheeze  HEART: Regular rate and rhythm; No murmurs, rubs, or gallops  ABDOMEN: Soft, Nontender, Nondistended; Bowel sounds present  EXTREMITIES:  2+ Peripheral Pulses, No clubbing, cyanosis, or edema  NEUROLOGY: non-focal  SKIN: No rashes or lesions                          12.7   5.7   )-----------( 360      ( 2018 06:38 )             39.9           137  |  98  |  21  ----------------------------<  94  3.5   |  24  |  1.17    Ca    10.0      2018 06:52  Phos  3.8       Mg     1.8         TPro  6.7  /  Alb  3.7  /  TBili  0.2  /  DBili  x   /  AST  18  /  ALT  36  /  AlkPhos  104        Magnesium, Serum: 1.8 mg/dL ( @ 06:52)  Phosphorus Level, Serum: 3.8 mg/dL ( @ 06:52)

## 2018-06-28 NOTE — CONSULT NOTE ADULT - ATTENDING COMMENTS
52 yo female with colitis and splenic infarct  PMH as noted  unclear etiology for splenic infarct  recc thrombophilia w/u - APLS panel, Protein C, S, Factor V leiden, Prothrombin gene mutation  cont w/u as per cards  cont asa and plavix as per cards  further reccs pending lab findings
Rg Nichols MD, FACP, FACG, AGAF  [facility]  [phone]

## 2018-06-28 NOTE — CONSULT NOTE ADULT - ASSESSMENT
Assessment:   53 female with multiple medical problems, carotid stenosis HTN had CVA 1/2018   on ASA and Plavix , She presents to the ED 6/27/18 with complaint of 3 weeks of intermittent abdominal pain. The CT reported mild pancolitis. Questionable inflammation of the terminal ileum. Upper pole splenic infarcts. The CT today  6/28 reports  Patent abdominal vasculature. Improved pancolitis. with small areas of venous hypo-enhancement in the superior pole of the spleen, suggestive of infarct, decreased in size from prior study. No renal infarcts. the CTA reported Patent abdominal vasculature.     Plan:  Monitor labs and trends as ordered   Continue antibiotics   Continue PPI and Carafate  Simethecone QID PRN for bloating   Continue clears today   A diet advance to to low fiber as patient clinically improves reevaluate in am   Eventual upper endoscopy and colonoscopy in 6-8 weeks and when medically stable from cardiac/ neurological standpoint to undergo procedures      [facility]  [address] [place] [address]  [address] [phone] 53 female with multiple medical problems, carotid stenosis HTN had CVA 1/2018   on ASA and Plavix , She presents to the ED 6/27/18 with complaint of 3 weeks of intermittent abdominal pain. The CT reported mild pancolitis. Questionable inflammation of the terminal ileum. Upper pole splenic infarcts. The CT today  6/28 reports  Patent abdominal vasculature. Improved pancolitis. with small areas of venous hypo-enhancement in the superior pole of the spleen, suggestive of infarct, decreased in size from prior study. No renal infarcts. the CTA reported Patent abdominal vasculature.     Rec -  Monitor labs and trends as ordered   Continue antibiotics   Continue PPI and Carafate  Simethecone QID PRN for bloating   Continue clears today   A diet advance to to low fiber as patient clinically improves reevaluate in am   Eventual upper endoscopy and colonoscopy in 6-8 weeks and when medically stable from cardiac/ neurological standpoint to undergo procedures      EVELYN [facility]  [address] [place]  [address] [phone]

## 2018-06-29 LAB
ANION GAP SERPL CALC-SCNC: 13 MMOL/L — SIGNIFICANT CHANGE UP (ref 5–17)
BUN SERPL-MCNC: 11 MG/DL — SIGNIFICANT CHANGE UP (ref 7–23)
C DIFF GDH STL QL: NEGATIVE — SIGNIFICANT CHANGE UP
C DIFF GDH STL QL: SIGNIFICANT CHANGE UP
CALCIUM SERPL-MCNC: 9.9 MG/DL — SIGNIFICANT CHANGE UP (ref 8.4–10.5)
CHLORIDE SERPL-SCNC: 102 MMOL/L — SIGNIFICANT CHANGE UP (ref 96–108)
CO2 SERPL-SCNC: 24 MMOL/L — SIGNIFICANT CHANGE UP (ref 22–31)
CREAT SERPL-MCNC: 1.01 MG/DL — SIGNIFICANT CHANGE UP (ref 0.5–1.3)
CULTURE RESULTS: NO GROWTH — SIGNIFICANT CHANGE UP
GLUCOSE SERPL-MCNC: 88 MG/DL — SIGNIFICANT CHANGE UP (ref 70–99)
HCT VFR BLD CALC: 43.4 % — SIGNIFICANT CHANGE UP (ref 34.5–45)
HGB BLD-MCNC: 13.9 G/DL — SIGNIFICANT CHANGE UP (ref 11.5–15.5)
MCHC RBC-ENTMCNC: 31.2 PG — SIGNIFICANT CHANGE UP (ref 27–34)
MCHC RBC-ENTMCNC: 31.9 GM/DL — LOW (ref 32–36)
MCV RBC AUTO: 97.7 FL — SIGNIFICANT CHANGE UP (ref 80–100)
PLATELET # BLD AUTO: 355 K/UL — SIGNIFICANT CHANGE UP (ref 150–400)
POTASSIUM SERPL-MCNC: 4.1 MMOL/L — SIGNIFICANT CHANGE UP (ref 3.5–5.3)
POTASSIUM SERPL-SCNC: 4.1 MMOL/L — SIGNIFICANT CHANGE UP (ref 3.5–5.3)
RBC # BLD: 4.44 M/UL — SIGNIFICANT CHANGE UP (ref 3.8–5.2)
RBC # FLD: 11.9 % — SIGNIFICANT CHANGE UP (ref 10.3–14.5)
SODIUM SERPL-SCNC: 139 MMOL/L — SIGNIFICANT CHANGE UP (ref 135–145)
SPECIMEN SOURCE: SIGNIFICANT CHANGE UP
WBC # BLD: 4.2 K/UL — SIGNIFICANT CHANGE UP (ref 3.8–10.5)
WBC # FLD AUTO: 4.2 K/UL — SIGNIFICANT CHANGE UP (ref 3.8–10.5)

## 2018-06-29 PROCEDURE — G0452: CPT | Mod: 26

## 2018-06-29 RX ADMIN — Medication 0.5 MILLIGRAM(S): at 21:56

## 2018-06-29 RX ADMIN — Medication 0.5 MILLIGRAM(S): at 13:32

## 2018-06-29 RX ADMIN — OXYCODONE AND ACETAMINOPHEN 1 TABLET(S): 5; 325 TABLET ORAL at 10:41

## 2018-06-29 RX ADMIN — Medication 1 SPRAY(S): at 10:11

## 2018-06-29 RX ADMIN — OXYCODONE AND ACETAMINOPHEN 1 TABLET(S): 5; 325 TABLET ORAL at 10:11

## 2018-06-29 RX ADMIN — OXYCODONE AND ACETAMINOPHEN 1 TABLET(S): 5; 325 TABLET ORAL at 15:57

## 2018-06-29 RX ADMIN — Medication 10 MILLIGRAM(S): at 05:17

## 2018-06-29 RX ADMIN — OXYCODONE AND ACETAMINOPHEN 1 TABLET(S): 5; 325 TABLET ORAL at 20:43

## 2018-06-29 RX ADMIN — ROSUVASTATIN CALCIUM 10 MILLIGRAM(S): 5 TABLET ORAL at 21:56

## 2018-06-29 RX ADMIN — SPIRONOLACTONE 25 MILLIGRAM(S): 25 TABLET, FILM COATED ORAL at 17:58

## 2018-06-29 RX ADMIN — OXYCODONE AND ACETAMINOPHEN 1 TABLET(S): 5; 325 TABLET ORAL at 15:27

## 2018-06-29 RX ADMIN — SPIRONOLACTONE 25 MILLIGRAM(S): 25 TABLET, FILM COATED ORAL at 05:17

## 2018-06-29 RX ADMIN — OXYCODONE AND ACETAMINOPHEN 1 TABLET(S): 5; 325 TABLET ORAL at 20:13

## 2018-06-29 RX ADMIN — CLOPIDOGREL BISULFATE 75 MILLIGRAM(S): 75 TABLET, FILM COATED ORAL at 10:11

## 2018-06-29 RX ADMIN — Medication 10 MILLIGRAM(S): at 17:58

## 2018-06-29 RX ADMIN — Medication 1 GRAM(S): at 05:17

## 2018-06-29 RX ADMIN — Medication 200 MILLIGRAM(S): at 17:58

## 2018-06-29 RX ADMIN — Medication 200 MILLIGRAM(S): at 06:28

## 2018-06-29 RX ADMIN — Medication 1 GRAM(S): at 17:58

## 2018-06-29 RX ADMIN — Medication 0.5 MILLIGRAM(S): at 06:54

## 2018-06-29 RX ADMIN — OXYCODONE AND ACETAMINOPHEN 1 TABLET(S): 5; 325 TABLET ORAL at 04:12

## 2018-06-29 RX ADMIN — Medication 200 MILLIGRAM(S): at 05:17

## 2018-06-29 RX ADMIN — Medication 100 MILLIGRAM(S): at 15:00

## 2018-06-29 RX ADMIN — Medication 100 MILLIGRAM(S): at 05:17

## 2018-06-29 RX ADMIN — Medication 1 PATCH: at 10:16

## 2018-06-29 RX ADMIN — PANTOPRAZOLE SODIUM 40 MILLIGRAM(S): 20 TABLET, DELAYED RELEASE ORAL at 05:17

## 2018-06-29 RX ADMIN — OXYCODONE AND ACETAMINOPHEN 1 TABLET(S): 5; 325 TABLET ORAL at 04:50

## 2018-06-29 RX ADMIN — Medication 1 PATCH: at 10:11

## 2018-06-29 RX ADMIN — SIMETHICONE 80 MILLIGRAM(S): 80 TABLET, CHEWABLE ORAL at 10:11

## 2018-06-29 RX ADMIN — SIMETHICONE 80 MILLIGRAM(S): 80 TABLET, CHEWABLE ORAL at 04:11

## 2018-06-29 RX ADMIN — Medication 100 MILLIGRAM(S): at 21:04

## 2018-06-29 RX ADMIN — Medication 81 MILLIGRAM(S): at 10:11

## 2018-06-29 RX ADMIN — SIMETHICONE 80 MILLIGRAM(S): 80 TABLET, CHEWABLE ORAL at 21:07

## 2018-06-30 LAB
ANION GAP SERPL CALC-SCNC: 12 MMOL/L — SIGNIFICANT CHANGE UP (ref 5–17)
BUN SERPL-MCNC: 19 MG/DL — SIGNIFICANT CHANGE UP (ref 7–23)
CALCIUM SERPL-MCNC: 10.5 MG/DL — SIGNIFICANT CHANGE UP (ref 8.4–10.5)
CHLORIDE SERPL-SCNC: 99 MMOL/L — SIGNIFICANT CHANGE UP (ref 96–108)
CO2 SERPL-SCNC: 31 MMOL/L — SIGNIFICANT CHANGE UP (ref 22–31)
CREAT SERPL-MCNC: 1.07 MG/DL — SIGNIFICANT CHANGE UP (ref 0.5–1.3)
GLUCOSE SERPL-MCNC: 102 MG/DL — HIGH (ref 70–99)
HCT VFR BLD CALC: 42.3 % — SIGNIFICANT CHANGE UP (ref 34.5–45)
HGB BLD-MCNC: 14 G/DL — SIGNIFICANT CHANGE UP (ref 11.5–15.5)
MCHC RBC-ENTMCNC: 32.3 PG — SIGNIFICANT CHANGE UP (ref 27–34)
MCHC RBC-ENTMCNC: 33.1 GM/DL — SIGNIFICANT CHANGE UP (ref 32–36)
MCV RBC AUTO: 97.4 FL — SIGNIFICANT CHANGE UP (ref 80–100)
PLATELET # BLD AUTO: 383 K/UL — SIGNIFICANT CHANGE UP (ref 150–400)
POTASSIUM SERPL-MCNC: 4.4 MMOL/L — SIGNIFICANT CHANGE UP (ref 3.5–5.3)
POTASSIUM SERPL-SCNC: 4.4 MMOL/L — SIGNIFICANT CHANGE UP (ref 3.5–5.3)
RBC # BLD: 4.34 M/UL — SIGNIFICANT CHANGE UP (ref 3.8–5.2)
RBC # FLD: 11.6 % — SIGNIFICANT CHANGE UP (ref 10.3–14.5)
SODIUM SERPL-SCNC: 142 MMOL/L — SIGNIFICANT CHANGE UP (ref 135–145)
WBC # BLD: 6.9 K/UL — SIGNIFICANT CHANGE UP (ref 3.8–10.5)
WBC # FLD AUTO: 6.9 K/UL — SIGNIFICANT CHANGE UP (ref 3.8–10.5)

## 2018-06-30 RX ORDER — SUCRALFATE 1 G
1 TABLET ORAL
Qty: 0 | Refills: 0 | Status: DISCONTINUED | OUTPATIENT
Start: 2018-06-30 | End: 2018-07-01

## 2018-06-30 RX ORDER — ALPRAZOLAM 0.25 MG
0.5 TABLET ORAL ONCE
Qty: 0 | Refills: 0 | Status: DISCONTINUED | OUTPATIENT
Start: 2018-06-30 | End: 2018-06-30

## 2018-06-30 RX ADMIN — Medication 0.5 MILLIGRAM(S): at 18:47

## 2018-06-30 RX ADMIN — Medication 1 SPRAY(S): at 11:39

## 2018-06-30 RX ADMIN — Medication 0.5 MILLIGRAM(S): at 22:21

## 2018-06-30 RX ADMIN — ROSUVASTATIN CALCIUM 10 MILLIGRAM(S): 5 TABLET ORAL at 22:21

## 2018-06-30 RX ADMIN — SIMETHICONE 80 MILLIGRAM(S): 80 TABLET, CHEWABLE ORAL at 17:54

## 2018-06-30 RX ADMIN — SIMETHICONE 80 MILLIGRAM(S): 80 TABLET, CHEWABLE ORAL at 05:18

## 2018-06-30 RX ADMIN — Medication 0.5 MILLIGRAM(S): at 01:50

## 2018-06-30 RX ADMIN — Medication 100 MILLIGRAM(S): at 22:21

## 2018-06-30 RX ADMIN — OXYCODONE AND ACETAMINOPHEN 1 TABLET(S): 5; 325 TABLET ORAL at 12:15

## 2018-06-30 RX ADMIN — CLOPIDOGREL BISULFATE 75 MILLIGRAM(S): 75 TABLET, FILM COATED ORAL at 11:37

## 2018-06-30 RX ADMIN — Medication 1 GRAM(S): at 11:39

## 2018-06-30 RX ADMIN — Medication 1 PATCH: at 11:38

## 2018-06-30 RX ADMIN — Medication 1 GRAM(S): at 05:17

## 2018-06-30 RX ADMIN — Medication 10 MILLIGRAM(S): at 05:17

## 2018-06-30 RX ADMIN — Medication 100 MILLIGRAM(S): at 05:17

## 2018-06-30 RX ADMIN — SPIRONOLACTONE 25 MILLIGRAM(S): 25 TABLET, FILM COATED ORAL at 17:52

## 2018-06-30 RX ADMIN — Medication 100 MILLIGRAM(S): at 14:30

## 2018-06-30 RX ADMIN — Medication 1 GRAM(S): at 23:22

## 2018-06-30 RX ADMIN — PANTOPRAZOLE SODIUM 40 MILLIGRAM(S): 20 TABLET, DELAYED RELEASE ORAL at 05:17

## 2018-06-30 RX ADMIN — Medication 1 GRAM(S): at 17:53

## 2018-06-30 RX ADMIN — OXYCODONE AND ACETAMINOPHEN 1 TABLET(S): 5; 325 TABLET ORAL at 11:41

## 2018-06-30 RX ADMIN — Medication 200 MILLIGRAM(S): at 05:17

## 2018-06-30 RX ADMIN — OXYCODONE AND ACETAMINOPHEN 1 TABLET(S): 5; 325 TABLET ORAL at 22:21

## 2018-06-30 RX ADMIN — Medication 200 MILLIGRAM(S): at 06:25

## 2018-06-30 RX ADMIN — Medication 10 MILLIGRAM(S): at 17:50

## 2018-06-30 RX ADMIN — Medication 1 PATCH: at 10:00

## 2018-06-30 RX ADMIN — Medication 81 MILLIGRAM(S): at 11:37

## 2018-06-30 RX ADMIN — OXYCODONE AND ACETAMINOPHEN 1 TABLET(S): 5; 325 TABLET ORAL at 23:00

## 2018-06-30 RX ADMIN — Medication 0.5 MILLIGRAM(S): at 11:40

## 2018-06-30 RX ADMIN — Medication 200 MILLIGRAM(S): at 17:49

## 2018-06-30 RX ADMIN — SPIRONOLACTONE 25 MILLIGRAM(S): 25 TABLET, FILM COATED ORAL at 05:17

## 2018-06-30 RX ADMIN — SIMETHICONE 80 MILLIGRAM(S): 80 TABLET, CHEWABLE ORAL at 22:24
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
## 2018-06-30 NOTE — PROGRESS NOTE ADULT - ASSESSMENT
53 female with multiple medical problems, carotid stenosis HTN had CVA 1/2018 on ASA and Plavix.  She presented to the ED 6/27/18 with complaint of 3 weeks of intermittent abdominal pain.  The CT revealed mild pancolitis and questionable inflammation of the terminal ileum as well as upper pole splenic infarcts.  Patent abdominal vasculature noted.     Rec -  Monitor labs and trends as ordered   Continue antibiotics   Continue PPI and Carafate  Simethecone QID PRN for bloating   Advance diet to solids  Eventual upper endoscopy and colonoscopy in 6-8 weeks and when medically stable from cardiac/ neurological standpoint to undergo procedures
53 female with multiple medical problems, carotid stenosis HTN had CVA 1/2018 on ASA and Plavix.  She presented to the ED 6/27/18 with complaint of 3 weeks of intermittent abdominal pain.  The CT revealed mild pancolitis and questionable inflammation of the terminal ileum as well as upper pole splenic infarcts.  Patent abdominal vasculature noted.     Rec -  Continue antibiotics   Continue PPI and Carafate (changed to suspension per patient request)  Simethecone QID PRN for bloating   Eventual upper endoscopy and colonoscopy in 6-8 weeks and when medically stable from cardiac/ neurological standpoint to undergo procedures

## 2018-06-30 NOTE — PROGRESS NOTE ADULT - ATTENDING COMMENTS
Rg Nichols MD, FACP, FACG, AGAF  [facility]  [phone]
Rg Nichols MD, FACP, FACG, AGAF  [facility]  [phone]

## 2018-07-01 ENCOUNTER — TRANSCRIPTION ENCOUNTER (OUTPATIENT)
Age: 53
End: 2018-07-01

## 2018-07-01 VITALS — SYSTOLIC BLOOD PRESSURE: 130 MMHG | HEART RATE: 80 BPM | DIASTOLIC BLOOD PRESSURE: 88 MMHG

## 2018-07-01 LAB
CULTURE RESULTS: SIGNIFICANT CHANGE UP
SPECIMEN SOURCE: SIGNIFICANT CHANGE UP

## 2018-07-01 PROCEDURE — 83690 ASSAY OF LIPASE: CPT

## 2018-07-01 PROCEDURE — 86900 BLOOD TYPING SEROLOGIC ABO: CPT

## 2018-07-01 PROCEDURE — 81001 URINALYSIS AUTO W/SCOPE: CPT

## 2018-07-01 PROCEDURE — 96375 TX/PRO/DX INJ NEW DRUG ADDON: CPT

## 2018-07-01 PROCEDURE — 87324 CLOSTRIDIUM AG IA: CPT

## 2018-07-01 PROCEDURE — 81025 URINE PREGNANCY TEST: CPT

## 2018-07-01 PROCEDURE — 82947 ASSAY GLUCOSE BLOOD QUANT: CPT

## 2018-07-01 PROCEDURE — 87040 BLOOD CULTURE FOR BACTERIA: CPT

## 2018-07-01 PROCEDURE — 86147 CARDIOLIPIN ANTIBODY EA IG: CPT

## 2018-07-01 PROCEDURE — 84132 ASSAY OF SERUM POTASSIUM: CPT

## 2018-07-01 PROCEDURE — 80048 BASIC METABOLIC PNL TOTAL CA: CPT

## 2018-07-01 PROCEDURE — 74174 CTA ABD&PLVS W/CONTRAST: CPT

## 2018-07-01 PROCEDURE — 82803 BLOOD GASES ANY COMBINATION: CPT

## 2018-07-01 PROCEDURE — 85613 RUSSELL VIPER VENOM DILUTED: CPT

## 2018-07-01 PROCEDURE — 86901 BLOOD TYPING SEROLOGIC RH(D): CPT

## 2018-07-01 PROCEDURE — 87449 NOS EACH ORGANISM AG IA: CPT

## 2018-07-01 PROCEDURE — 96376 TX/PRO/DX INJ SAME DRUG ADON: CPT

## 2018-07-01 PROCEDURE — 84295 ASSAY OF SERUM SODIUM: CPT

## 2018-07-01 PROCEDURE — 74177 CT ABD & PELVIS W/CONTRAST: CPT

## 2018-07-01 PROCEDURE — 81241 F5 GENE: CPT

## 2018-07-01 PROCEDURE — 86146 BETA-2 GLYCOPROTEIN ANTIBODY: CPT

## 2018-07-01 PROCEDURE — 82330 ASSAY OF CALCIUM: CPT

## 2018-07-01 PROCEDURE — 87086 URINE CULTURE/COLONY COUNT: CPT

## 2018-07-01 PROCEDURE — 85610 PROTHROMBIN TIME: CPT

## 2018-07-01 PROCEDURE — 81240 F2 GENE: CPT

## 2018-07-01 PROCEDURE — 87507 IADNA-DNA/RNA PROBE TQ 12-25: CPT

## 2018-07-01 PROCEDURE — 94640 AIRWAY INHALATION TREATMENT: CPT

## 2018-07-01 PROCEDURE — 99285 EMERGENCY DEPT VISIT HI MDM: CPT | Mod: 25

## 2018-07-01 PROCEDURE — 83605 ASSAY OF LACTIC ACID: CPT

## 2018-07-01 PROCEDURE — 96374 THER/PROPH/DIAG INJ IV PUSH: CPT | Mod: XU

## 2018-07-01 PROCEDURE — 86850 RBC ANTIBODY SCREEN: CPT

## 2018-07-01 PROCEDURE — 85730 THROMBOPLASTIN TIME PARTIAL: CPT

## 2018-07-01 PROCEDURE — 85014 HEMATOCRIT: CPT

## 2018-07-01 PROCEDURE — 82435 ASSAY OF BLOOD CHLORIDE: CPT

## 2018-07-01 PROCEDURE — 85027 COMPLETE CBC AUTOMATED: CPT

## 2018-07-01 PROCEDURE — 80053 COMPREHEN METABOLIC PANEL: CPT

## 2018-07-01 PROCEDURE — 83735 ASSAY OF MAGNESIUM: CPT

## 2018-07-01 PROCEDURE — 84100 ASSAY OF PHOSPHORUS: CPT

## 2018-07-01 RX ORDER — FELODIPINE 5 MG/1
1 TABLET, FILM COATED, EXTENDED RELEASE ORAL
Qty: 0 | Refills: 0 | COMMUNITY

## 2018-07-01 RX ORDER — METRONIDAZOLE 500 MG
1 TABLET ORAL
Qty: 12 | Refills: 0 | OUTPATIENT
Start: 2018-07-01 | End: 2018-07-04

## 2018-07-01 RX ORDER — CIPROFLOXACIN LACTATE 400MG/40ML
1 VIAL (ML) INTRAVENOUS
Qty: 8 | Refills: 0 | OUTPATIENT
Start: 2018-07-01 | End: 2018-07-04

## 2018-07-01 RX ORDER — SUCRALFATE 1 G
10 TABLET ORAL
Qty: 300 | Refills: 0 | OUTPATIENT
Start: 2018-07-01 | End: 2018-07-30

## 2018-07-01 RX ORDER — SUCRALFATE 1 G
1 TABLET ORAL
Qty: 0 | Refills: 0 | COMMUNITY

## 2018-07-01 RX ORDER — SIMETHICONE 80 MG/1
1 TABLET, CHEWABLE ORAL
Qty: 0 | Refills: 0 | COMMUNITY
Start: 2018-07-01

## 2018-07-01 RX ORDER — NICOTINE POLACRILEX 2 MG
1 GUM BUCCAL
Qty: 30 | Refills: 0 | OUTPATIENT
Start: 2018-07-01 | End: 2018-07-30

## 2018-07-01 RX ORDER — SUCRALFATE 1 G
10 TABLET ORAL
Qty: 0 | Refills: 0 | COMMUNITY
Start: 2018-07-01

## 2018-07-01 RX ADMIN — Medication 1 GRAM(S): at 06:01

## 2018-07-01 RX ADMIN — CLOPIDOGREL BISULFATE 75 MILLIGRAM(S): 75 TABLET, FILM COATED ORAL at 12:53

## 2018-07-01 RX ADMIN — OXYCODONE AND ACETAMINOPHEN 1 TABLET(S): 5; 325 TABLET ORAL at 03:36

## 2018-07-01 RX ADMIN — Medication 200 MILLIGRAM(S): at 07:14

## 2018-07-01 RX ADMIN — Medication 1 PATCH: at 12:00

## 2018-07-01 RX ADMIN — Medication 10 MILLIGRAM(S): at 06:01

## 2018-07-01 RX ADMIN — Medication 100 MILLIGRAM(S): at 06:00

## 2018-07-01 RX ADMIN — Medication 200 MILLIGRAM(S): at 06:01

## 2018-07-01 RX ADMIN — OXYCODONE AND ACETAMINOPHEN 1 TABLET(S): 5; 325 TABLET ORAL at 04:00

## 2018-07-01 RX ADMIN — Medication 1 SPRAY(S): at 12:54

## 2018-07-01 RX ADMIN — Medication 0.5 MILLIGRAM(S): at 12:56

## 2018-07-01 RX ADMIN — Medication 81 MILLIGRAM(S): at 12:53

## 2018-07-01 RX ADMIN — SIMETHICONE 80 MILLIGRAM(S): 80 TABLET, CHEWABLE ORAL at 03:39

## 2018-07-01 RX ADMIN — SPIRONOLACTONE 25 MILLIGRAM(S): 25 TABLET, FILM COATED ORAL at 06:00

## 2018-07-01 RX ADMIN — PANTOPRAZOLE SODIUM 40 MILLIGRAM(S): 20 TABLET, DELAYED RELEASE ORAL at 06:01

## 2018-07-01 RX ADMIN — Medication 1 GRAM(S): at 12:54

## 2018-07-01 RX ADMIN — Medication 0.5 MILLIGRAM(S): at 06:00

## 2018-07-01 NOTE — DISCHARGE NOTE ADULT - ADDITIONAL INSTRUCTIONS
Follow up with Dr. Goff in 1 week.   Follow up with Dr. Nichols in 1 week to schedule EGD/colonoscopy.

## 2018-07-01 NOTE — DISCHARGE NOTE ADULT - HOSPITAL COURSE
To be done. 53F with multiple prior cva s/p recent loop implant adm with adb pain  splenic infarct on CT  heme consult noted  loop recorder no events  d/w GI  tolerating PO  willl continue Cipro and Flagyl x 4 days to complete 7 day course.    cont anti-plt  DC home on oral abx

## 2018-07-01 NOTE — DISCHARGE NOTE ADULT - CARE PROVIDER_API CALL
Jonathan Goff), Cardiovascular Disease; Internal Medicine  [address] [place] [id number]  Phone: [phone]  Fax: [phone]    Rg Nichols), Gastroenterology; Internal Medicine  [address] [id number]  Phone: [phone]  Fax: [phone]

## 2018-07-01 NOTE — DISCHARGE NOTE ADULT - PATIENT PORTAL LINK FT
You can access the [facility] Patient Portal, offered by [facility], by registering with the following website: [email or web address]

## 2018-07-01 NOTE — DISCHARGE NOTE ADULT - MEDICATION SUMMARY - MEDICATIONS TO TAKE
I will START or STAY ON the medications listed below when I get home from the hospital:    spironolactone 25 mg oral tablet  -- 1 tab(s) by mouth 2 times a day  -- Indication: For CAD (coronary artery disease)    Flagyl 500 mg oral tablet  -- 1 tab(s) by mouth 3 times a day MDD:3  to complete 7 day treatment course.   -- Do not drink alcoholic beverages when taking this medication.  Finish all this medication unless otherwise directed by prescriber.  May discolor urine or feces.    -- Indication: For Pancolitis    oxyCODONE-acetaminophen 5 mg-325 mg oral tablet  -- 1 tab(s) by mouth every 4 hours, As needed, Moderate Pain (4 - 6)/Severe Pain (7 - 10) MDD:6  -- Indication: For Pancolitis    aspirin 81 mg oral tablet  -- 1 tab(s) by mouth once a day  -- Indication: For CAD (coronary artery disease)    carBAMazepine 200 mg oral tablet, extended release  -- 1 tab(s) by mouth 2 times a day  -- Indication: For Seizure    Crestor 10 mg oral tablet  -- 1 tab(s) by mouth once a day (at bedtime)  -- Indication: For CAD (coronary artery disease)    enalapril-hydrochlorothiazide 10 mg-25 mg oral tablet  -- 1 tab(s) by mouth 2 times a day  -- Indication: For Hypertension    Plavix 75 mg oral tablet  -- 1 tab(s) by mouth once a day (in the morning)  -- Indication: For CAD (coronary artery disease)    ALPRAZolam 0.5 mg oral tablet  -- 1 tab(s) by mouth 3 times a day  -- Indication: For Anxiety    Metoprolol Succinate  mg oral tablet, extended release  -- 1 tab(s) by mouth once a day (in the morning)  -- Indication: For CAD (coronary artery disease)    sucralfate 1 g/10 mL oral suspension  -- 10 milliliter(s) by mouth 4 times a day  -- Indication: For Pancolitis    simethicone 80 mg oral tablet, chewable  -- 1 tab(s) by mouth 4 times a day, As needed, / bloating  -- Indication: For GERD (Gastroesophageal Reflux Disease)    fluticasone 50 mcg/inh nasal spray  -- 2 spray(s) into nose 3 times a day  -- Indication: For Allergic Rhinitis.     NexIUM 24HR 20 mg oral delayed release capsule  -- 1 cap(s) by mouth 2 times a day  -- Indication: For GERD (Gastroesophageal Reflux Disease)    ciprofloxacin 500 mg oral tablet  -- 1 tab(s) by mouth every 12 hours MDD:2  To complete 7 day treatment course.   -- Avoid prolonged or excessive exposure to direct and/or artificial sunlight while taking this medication.  Check with your doctor before becoming pregnant.  Do not take dairy products, antacids, or iron preparations within one hour of this medication.  Finish all this medication unless otherwise directed by prescriber.  Medication should be taken with plenty of water.    -- Indication: For Pancolitis    nicotine 14 mg/24 hr transdermal film, extended release  -- 1 patch by transdermal patch once a day MDD:1  -- Indication: For Smoke Cessation

## 2018-07-01 NOTE — PROGRESS NOTE ADULT - SUBJECTIVE AND OBJECTIVE BOX
- Patient seen and examined.  - In summary, patient is a 53 year old woman who presented with abdominal pain (2018 21:14)  - Today, patient is without complaints.         *****MEDICATIONS:    MEDICATIONS  (STANDING):  aspirin enteric coated 81 milliGRAM(s) Oral daily  ciprofloxacin   IVPB 400 milliGRAM(s) IV Intermittent every 12 hours  clopidogrel Tablet 75 milliGRAM(s) Oral daily  enalapril 10 milliGRAM(s) Oral two times a day  fluticasone propionate 50 MICROgram(s)/spray Nasal Spray 1 Spray(s) Both Nostrils daily  metoprolol succinate  milliGRAM(s) Oral daily  metroNIDAZOLE  IVPB 500 milliGRAM(s) IV Intermittent every 8 hours  nicotine -  14 mG/24Hr(s) Patch 1 patch Transdermal daily  pantoprazole    Tablet 40 milliGRAM(s) Oral before breakfast  rosuvastatin 10 milliGRAM(s) Oral at bedtime  sodium chloride 0.9%. 1000 milliLiter(s) (100 mL/Hr) IV Continuous <Continuous>  spironolactone 25 milliGRAM(s) Oral two times a day  sucralfate 1 Gram(s) Oral two times a day    MEDICATIONS  (PRN):  ALPRAZolam 0.5 milliGRAM(s) Oral three times a day PRN anxiety           ***** REVIEW OF SYSTEM:  GEN: no fever, no chills, no pain  RESP: no SOB, no cough, no sputum  CVS: no chest pain, no palpitations, no edema  GI: no abdominal pain, no nausea, no vomiting, no constipation, no diarrhea  : no dysuria, no frequency  NEURO: no headache, no dizziness  PSYCH: no depression, not anxious  Derm : no itching, no rash         ***** VITAL SIGNS:  T(F): 97.7 (18 @ 04:27), Max: 98.7 (18 @ 20:30)  HR: 71 (18 @ 04:27) (68 - 75)  BP: 100/66 (18 @ 04:27) (91/66 - 150/83)  RR: 18 (18 @ 04:27) (17 - 19)  SpO2: 98% (18 @ 04:27) (97% - 100%)  Wt(kg): --  ,   I&O's Summary    2018 07:01  -  2018 07:00  --------------------------------------------------------  IN: 1000 mL / OUT: 600 mL / NET: 400 mL             *****PHYSICAL EXAM:  GEN: A&O X 3 , NAD , comfortable  HEENT: NCAT, EOMI, MMM, no icterus  NECK: Supple, No JVD  CVS: S1S2 , regular , No M/R/G appreciated  PULM: CTA B/L,  no W/R/R appreciated  ABD.: soft. non tender, non distended,  bowel sounds present  Extrem: intact pulses , no edema noted  Derm: No rash or ecchymosis noted  PSYCH: normal mood, no depression, not anxious         *****LAB AND IMAGIN.7   5.7   )-----------( 360      ( 2018 06:38 )             39.9                   137  |  98  |  21  ----------------------------<  94  3.5   |  24  |  1.17    Ca    10.0      2018 06:52  Phos  3.8       Mg     1.8         TPro  6.7  /  Alb  3.7  /  TBili  0.2  /  DBili  x   /  AST  18  /  ALT  36  /  AlkPhos  104      PT/INR - ( 2018 06:38 )   PT: 10.5 sec;   INR: 0.97 ratio         PTT - ( 2018 11:30 )  PTT:26.3 sec                   Urinalysis Basic - ( 2018 14:04 )    Color: Red / Appearance: Turbid / S.021 / pH: x  Gluc: x / Ketone: Negative  / Bili: Negative / Urobili: Negative   Blood: x / Protein: 30 mg/dL / Nitrite: Negative   Leuk Esterase: Large / RBC: x / WBC 2-5 /HPF   Sq Epi: x / Non Sq Epi: Occasional /HPF / Bacteria: Moderate /HPF      [All pertinent recent Imaging/Reports reviewed]         *****A S S E S S M E N T   A N D   P L A N :  53F with multiple prior cva s/p recent loop implant adm with adb pain  splenic infarct on CT  check loop recorder  heme consult  abx for colitis  cont anti-plt    __________________________  AKatherine Herrera D.O.
- Patient seen and examined.  - In summary, patient is a 53 year old woman who presented with abdominal pain (2018 21:14)  - Today, patient is without complaints.         *****MEDICATIONS:    MEDICATIONS  (STANDING):  aspirin enteric coated 81 milliGRAM(s) Oral daily  ciprofloxacin   IVPB 400 milliGRAM(s) IV Intermittent every 12 hours  clopidogrel Tablet 75 milliGRAM(s) Oral daily  enalapril 10 milliGRAM(s) Oral two times a day  fluticasone propionate 50 MICROgram(s)/spray Nasal Spray 1 Spray(s) Both Nostrils daily  metoprolol succinate  milliGRAM(s) Oral daily  metroNIDAZOLE  IVPB 500 milliGRAM(s) IV Intermittent every 8 hours  nicotine -  14 mG/24Hr(s) Patch 1 patch Transdermal daily  pantoprazole    Tablet 40 milliGRAM(s) Oral before breakfast  rosuvastatin 10 milliGRAM(s) Oral at bedtime  sodium chloride 0.9%. 1000 milliLiter(s) (100 mL/Hr) IV Continuous <Continuous>  spironolactone 25 milliGRAM(s) Oral two times a day  sucralfate 1 Gram(s) Oral two times a day    MEDICATIONS  (PRN):  ALPRAZolam 0.5 milliGRAM(s) Oral three times a day PRN anxiety  oxyCODONE    5 mG/acetaminophen 325 mG 1 Tablet(s) Oral every 4 hours PRN Moderate Pain (4 - 6)/Severe Pain (7 - 10)  simethicone 80 milliGRAM(s) Chew four times a day PRN / bloating             ***** REVIEW OF SYSTEM:  GEN: no fever, no chills, no pain  RESP: no SOB, no cough, no sputum  CVS: no chest pain, no palpitations, no edema  GI: no abdominal pain, no nausea, no vomiting, no constipation, no diarrhea  : no dysuria, no frequency  NEURO: no headache, no dizziness  PSYCH: no depression, not anxious  Derm : no itching, no rash         ***** VITAL SIGNS:    T(F): 97.8 (18 @ 04:35), Max: 98 (18 @ 21:19)  HR: 69 (18 @ 06:33) (67 - 76)  BP: 128/86 (06-29-18 @ 06:33) (102/68 - 153/100)  RR: 18 (18 @ 04:35) (18 - 19)  SpO2: 95% (18 @ 04:35) (95% - 95%)  Wt(kg): --  ,   I&O's Summary    2018 07:01  -  2018 07:00  --------------------------------------------------------  IN: 1100 mL / OUT: 404 mL / NET: 696 mL                 *****PHYSICAL EXAM:  GEN: A&O X 3 , NAD , comfortable  HEENT: NCAT, EOMI, MMM, no icterus  NECK: Supple, No JVD  CVS: S1S2 , regular , No M/R/G appreciated  PULM: CTA B/L,  no W/R/R appreciated  ABD.: soft. non tender, non distended,  bowel sounds present  Extrem: intact pulses , no edema noted  Derm: No rash or ecchymosis noted  PSYCH: normal mood, no depression, not anxious         *****LAB AND IMAGIN.9   4.2   )-----------( 355      ( 2018 07:06 )             43.4                   139  |  102  |  11  ----------------------------<  88  4.1   |  24  |  1.01    Ca    9.9      2018 07:06  Phos  3.8     -  Mg     1.8         TPro  6.7  /  Alb  3.7  /  TBili  0.2  /  DBili  x   /  AST  18  /  ALT  36  /  AlkPhos  104  06-    PT/INR - ( 2018 06:38 )   PT: 10.5 sec;   INR: 0.97 ratio         PTT - ( 2018 11:30 )  PTT:26.3 sec                   [All pertinent recent Imaging/Reports reviewed]         *****A S S E S S M E N T   A N D   P L A N :  53F with multiple prior cva s/p recent loop implant adm with adb pain  splenic infarct on CT  heme consult noted  loop recorder no events  d/w GI  may eat  abx for colitis  cont anti-plt  If tolerates PO can be DC on oral abx    __________________________  SEBASTIAN Herrera D.O.
- Patient seen and examined.  - In summary, patient is a 53 year old woman who presented with abdominal pain (2018 21:14)  - Today, patient is without complaints.         *****MEDICATIONS:    MEDICATIONS  (STANDING):  aspirin enteric coated 81 milliGRAM(s) Oral daily  ciprofloxacin   IVPB 400 milliGRAM(s) IV Intermittent every 12 hours  clopidogrel Tablet 75 milliGRAM(s) Oral daily  enalapril 10 milliGRAM(s) Oral two times a day  fluticasone propionate 50 MICROgram(s)/spray Nasal Spray 1 Spray(s) Both Nostrils daily  metoprolol succinate  milliGRAM(s) Oral daily  metroNIDAZOLE  IVPB 500 milliGRAM(s) IV Intermittent every 8 hours  nicotine -  14 mG/24Hr(s) Patch 1 patch Transdermal daily  pantoprazole    Tablet 40 milliGRAM(s) Oral before breakfast  rosuvastatin 10 milliGRAM(s) Oral at bedtime  sodium chloride 0.9%. 1000 milliLiter(s) (100 mL/Hr) IV Continuous <Continuous>  spironolactone 25 milliGRAM(s) Oral two times a day  sucralfate suspension 1 Gram(s) Oral four times a day    MEDICATIONS  (PRN):  ALPRAZolam 0.5 milliGRAM(s) Oral three times a day PRN anxiety  oxyCODONE    5 mG/acetaminophen 325 mG 1 Tablet(s) Oral every 4 hours PRN Moderate Pain (4 - 6)/Severe Pain (7 - 10)  simethicone 80 milliGRAM(s) Chew four times a day PRN / bloating               ***** REVIEW OF SYSTEM:  GEN: no fever, no chills, no pain  RESP: no SOB, no cough, no sputum  CVS: no chest pain, no palpitations, no edema  GI: no abdominal pain, no nausea, no vomiting, no constipation, no diarrhea  : no dysuria, no frequency  NEURO: no headache, no dizziness  PSYCH: no depression, not anxious  Derm : no itching, no rash         ***** VITAL SIGNS:    T(F): 97.7 (18 @ 04:24), Max: 98 (18 @ 13:15)  HR: 68 (18 @ 04:24) (63 - 80)  BP: 111/74 (06-30-18 @ 04:24) (101/68 - 180/99)  RR: 18 (18 @ 04:24) (18 - 19)  SpO2: 100% (18 @ 04:24) (98% - 100%)  Wt(kg): --  ,   I&O's Summary    2018 07:01  -  2018 07:00  --------------------------------------------------------  IN: 1360 mL / OUT: 0 mL / NET: 1360 mL                 *****PHYSICAL EXAM:  GEN: A&O X 3 , NAD , comfortable  HEENT: NCAT, EOMI, MMM, no icterus  NECK: Supple, No JVD  CVS: S1S2 , regular , No M/R/G appreciated  PULM: CTA B/L,  no W/R/R appreciated  ABD.: soft. non tender, non distended,  bowel sounds present  Extrem: intact pulses , no edema noted  Derm: No rash or ecchymosis noted  PSYCH: normal mood, no depression, not anxious         *****LAB AND IMAGIN.0   6.9   )-----------( 383      ( 2018 06:26 )             42.3                   142  |  99  |  19  ----------------------------<  102<H>  4.4   |  31  |  1.07    Ca    10.5      2018 06:26              [All pertinent recent Imaging/Reports reviewed]         *****A S S E S S M E N T   A N D   P L A N :  53F with multiple prior cva s/p recent loop implant adm with adb pain  splenic infarct on CT  heme consult noted  loop recorder no events  GI f/u appreciated  tolerating  abx for colitis  cont anti-plt  DC today on oral abx    __________________________  SEBASTIAN Herrera D.O.
- Patient seen and examined.  - In summary, patient is a 53 year old woman who presented with abdominal pain (2018 21:14)  - Today, patient is without complaints.         *****MEDICATIONS:    MEDICATIONS  (STANDING):  aspirin enteric coated 81 milliGRAM(s) Oral daily  ciprofloxacin   IVPB 400 milliGRAM(s) IV Intermittent every 12 hours  clopidogrel Tablet 75 milliGRAM(s) Oral daily  enalapril 10 milliGRAM(s) Oral two times a day  fluticasone propionate 50 MICROgram(s)/spray Nasal Spray 1 Spray(s) Both Nostrils daily  metoprolol succinate  milliGRAM(s) Oral daily  metroNIDAZOLE  IVPB 500 milliGRAM(s) IV Intermittent every 8 hours  nicotine -  14 mG/24Hr(s) Patch 1 patch Transdermal daily  pantoprazole    Tablet 40 milliGRAM(s) Oral before breakfast  rosuvastatin 10 milliGRAM(s) Oral at bedtime  sodium chloride 0.9%. 1000 milliLiter(s) (100 mL/Hr) IV Continuous <Continuous>  spironolactone 25 milliGRAM(s) Oral two times a day  sucralfate suspension 1 Gram(s) Oral four times a day    MEDICATIONS  (PRN):  ALPRAZolam 0.5 milliGRAM(s) Oral three times a day PRN anxiety  oxyCODONE    5 mG/acetaminophen 325 mG 1 Tablet(s) Oral every 4 hours PRN Moderate Pain (4 - 6)/Severe Pain (7 - 10)  simethicone 80 milliGRAM(s) Chew four times a day PRN / bloating             ***** REVIEW OF SYSTEM:  GEN: no fever, no chills, no pain  RESP: no SOB, no cough, no sputum  CVS: no chest pain, no palpitations, no edema  GI: no abdominal pain, no nausea, no vomiting, no constipation, no diarrhea  : no dysuria, no frequency  NEURO: no headache, no dizziness  PSYCH: no depression, not anxious  Derm : no itching, no rash         ***** VITAL SIGNS:    T(F): 98.4 (18 @ 03:41), Max: 98.4 (18 @ 03:41)  HR: 80 (18 @ 06:02) (71 - 80)  BP: 130/88 (18 @ 06:02) (107/77 - 139/80)  RR: 18 (18 @ 03:41) (18 - 18)  SpO2: 98% (18 @ 03:41) (98% - 100%)  Wt(kg): --  ,   I&O's Summary    2018 07:01  -  2018 07:00  --------------------------------------------------------  IN: 1509 mL / OUT: 0 mL / NET: 1509 mL             *****PHYSICAL EXAM:  GEN: A&O X 3 , NAD , comfortable  HEENT: NCAT, EOMI, MMM, no icterus  NECK: Supple, No JVD  CVS: S1S2 , regular , No M/R/G appreciated  PULM: CTA B/L,  no W/R/R appreciated  ABD.: soft. non tender, non distended,  bowel sounds present  Extrem: intact pulses , no edema noted  Derm: No rash or ecchymosis noted  PSYCH: normal mood, no depression, not anxious         *****LAB AND IMAGIN.0   6.9   )-----------( 383      ( 2018 06:26 )             42.3               06    142  |  99  |  19  ----------------------------<  102<H>  4.4   |  31  |  1.07    Ca    10.5      2018 06:26            [All pertinent recent Imaging/Reports reviewed]         *****A S S E S S M E N T   A N D   P L A N :  53F with multiple prior cva s/p recent loop implant adm with adb pain  splenic infarct on CT  heme consult noted  loop recorder no events  d/w GI  tolerating PO  abx for colitis  cont anti-plt  DC home on oral abx    __________________________  SEBASTIAN Herrera D.O.
EP Nurse Practitioner ILR Interrogation:       [facility] ILR    -Indication for interrogation: Evaluate for A Fib   -Battery Status: Good  -Patient is in NSR.   -Review of stored data did not reveal any tachy or azul arythmias.   - AT/AF: 0%  - 6 Symptom episodes pressed 5/28- 6/27/18 not because patient had any symptoms or complaints. Patient states " I pressed the button because I was sleeping at boyfriends house at not home"  All episodes appear to be in NSR.     #[id number]
Patient is a 53y old  Female who presented with a chief complaint of ABD pain (2018 18:51)    INTERVAL HPI/OVERNIGHT EVENTS:  Feels much better today.  Wants to eat.    MEDICATIONS  (STANDING):  aspirin enteric coated 81 milliGRAM(s) Oral daily  ciprofloxacin   IVPB 400 milliGRAM(s) IV Intermittent every 12 hours  clopidogrel Tablet 75 milliGRAM(s) Oral daily  enalapril 10 milliGRAM(s) Oral two times a day  fluticasone propionate 50 MICROgram(s)/spray Nasal Spray 1 Spray(s) Both Nostrils daily  metoprolol succinate  milliGRAM(s) Oral daily  metroNIDAZOLE  IVPB 500 milliGRAM(s) IV Intermittent every 8 hours  nicotine -  14 mG/24Hr(s) Patch 1 patch Transdermal daily  pantoprazole    Tablet 40 milliGRAM(s) Oral before breakfast  rosuvastatin 10 milliGRAM(s) Oral at bedtime  sodium chloride 0.9%. 1000 milliLiter(s) (100 mL/Hr) IV Continuous <Continuous>  spironolactone 25 milliGRAM(s) Oral two times a day  sucralfate 1 Gram(s) Oral two times a day    MEDICATIONS  (PRN):  ALPRAZolam 0.5 milliGRAM(s) Oral three times a day PRN anxiety  oxyCODONE    5 mG/acetaminophen 325 mG 1 Tablet(s) Oral every 4 hours PRN Moderate Pain (4 - 6)/Severe Pain (7 - 10)  simethicone 80 milliGRAM(s) Chew four times a day PRN / bloating    Allergies  No Known Allergies    Review of Systems:  General:  No wt loss, fevers, chills, night sweats,fatigue,   ENT:  No sore throat, pain, runny nose, dysphagia  CV:  No pain, palpitatioins, hypo/hypertension  Resp:  No dyspnea, cough, tachypnea, wheezing  Neuro:  No weakness, tingling, memory problems  Heme:  No petechiae, ecchymosis, easy bruisability    Vital Signs Last 24 Hrs  T(C): 36.6 (2018 04:35), Max: 36.7 (2018 21:19)  T(F): 97.8 (2018 04:35), Max: 98 (2018 21:19)  HR: 69 (2018 06:33) (67 - 76)  BP: 128/86 (2018 06:33) (102/68 - 153/100)  BP(mean): --  RR: 18 (2018 04:35) (18 - 19)  SpO2: 95% (2018 04:35) (95% - 95%)    PHYSICAL EXAM:  Constitutional: NAD, well-developed  Neck: No LAD, supple  Respiratory: clear to auscultation b/l no rales, rhonchi, wheezing  Cardiovascular: S1 and S2, RRR, no murmur  Gastrointestinal: +BS x4, soft, NT/ND, neg HSM,  Extremities: No peripheral edema, neg clubbing, cyanosis  Vascular: 2+ peripheral pulses  Neurological: A/O x 3, no focal deficits  Psychiatric: Normal mood, normal affect  Skin: No rashes    LABS:                      13.9   4.2   )-----------( 355      ( 2018 07:06 )             43.4     06-29    139  |  102  |  11  ----------------------------<  88  4.1   |  24  |  1.01    Ca    9.9      2018 07:06  Phos  3.8     06-28  Mg     1.8     06-28    TPro  6.7  /  Alb  3.7  /  TBili  0.2  /  DBili  x   /  AST  18  /  ALT  36  /  AlkPhos  104  06-28    PT/INR - ( 2018 06:38 )   PT: 10.5 sec;   INR: 0.97 ratio    PTT - ( 2018 11:30 )  PTT:26.3 sec    Urinalysis Basic - ( 2018 14:04 )  Color: Red / Appearance: Turbid / S.021 / pH: x  Gluc: x / Ketone: Negative  / Bili: Negative / Urobili: Negative   Blood: x / Protein: 30 mg/dL / Nitrite: Negative   Leuk Esterase: Large / RBC: x / WBC 2-5 /HPF   Sq Epi: x / Non Sq Epi: Occasional /HPF / Bacteria: Moderate /HPF    RADIOLOGY & ADDITIONAL TESTS:   CT Abdomen and Pelvis w/ Oral Cont and w/ IV Cont (18 @ 14:14) >    EXAM:  CT ABDOMEN AND PELVIS OC IC                            PROCEDURE DATE:  2018    INTERPRETATION:  CLINICAL INFORMATION: Abdominal pain left lower quadrant   pain with bloody diarrhea for one day    COMPARISON: MR of the abdomen with and without IV contrast 10/25/2010    PROCEDURE:   CT of the Abdomen and Pelvis was performed with intravenous contrast.   Intravenous contrast: 90 ml Omnipaque 350. 10 ml discarded.  Oral contrast: positive contrast was administered.  Sagittal and coronal reformats were performed.    FINDINGS:    LOWER CHEST: Within normal limits.    LIVER: Cirrhotic liver morphology and hepatomegaly.  BILE DUCTS: The common bile duct is dilated measuring up to 1.0 cm.  GALLBLADDER: Cholecystectomy.  SPLEEN: Focal wedge-shaped hypodensities within the spleen consistent   with splenic infarct.  PANCREAS: Within normal limits.  ADRENALS: Right-sided adrenal nodule measuring 2.1 x 1.7 cm.  KIDNEYS/URETERS: Bilateral irregular cortical hypodensities.     BLADDER: Within normal limits.  REPRODUCTIVE ORGANS: The uterus is within normal limits. There is a 3.6   cm right-sided ovarian cyst.    BOWEL: No bowel obstruction. Mild pancolonic thickening. Questionable   inflammation of the terminal ileum. Appendix is normal. Duodenal   diverticulum.  PERITONEUM: No ascites.  VESSELS:  Atherosclerotic changes.  RETROPERITONEUM: No lymphadenopathy.    ABDOMINAL WALL: Within normal limits.  BONES: Degenerative changes of the spine.     IMPRESSION:   Mild pancolitis. Questionable inflammation of the terminal ileum.  Upper pole splenic infarcts.    Bilateral irregular cortical hypodense areas in the kidneys of uncertain   etiology; possibly small infarcts or pyelonephritis.  < from: CT Angio Abdomen and Pelvis w/ IV Cont (18 @ 10:23) >  EXAM:  CT ANGIO ABD PELV (W)AW IC                                PROCEDURE DATE:  2018    INTERPRETATION:  CLINICAL INFORMATION: Diarrhea for 3 weeks. Recent CT   shows colitis with splenic infarcts and possible renal infarcts.    COMPARISON: CT abdomen pelvis from 2018    PROCEDURE:   CT Angiography of the Abdomen and Pelvis.   Images were obtained in the arterial phase and venous phases.  Intravenous contrast: 90 ml Omnipaque 350. 10 ml discarded.  Oral contrast: None.  Sagittal and coronal reformats were performed as well as MIPS.    FINDINGS:    LOWER CHEST: Within normal limits.    LIVER: Within normal limits.  BILE DUCTS: Pneumobilia   GALLBLADDER: Cholecystectomy.  SPLEEN: Small foci of hypoenhancement in the superior aspect of the   spleen, decreased in size from prior study. Splenule.  PANCREAS: Within normal limits.  ADRENALS: Unchanged 1.7 cm right adrenal nodule.  KIDNEYS/URETERS: Normal arterial phase renal enhancement. Redemonstrated   cortical hypoenhancement on venous phase.    BLADDER: Within normal limits.  REPRODUCTIVE ORGANS:  Unchanged 3.6 cm right ovarian cyst. The uterus and   adnexa are otherwise within normal limits     BOWEL: Improved pancolonic wall thickening. Duodenal diverticulum. No   bowel obstruction. Appendix normal.  PERITONEUM: No ascites.  VESSELS:  No major vessel occlusion or high-grade stenosis. Mild aortic   atherosclerosis. Mild to moderate celiac axis stenosis.  RETROPERITONEUM: No lymphadenopathy.    ABDOMINAL WALL: Within normal limits.  BONES: Within normal limits.    IMPRESSION: Patent abdominal vasculature.     Improved pancolitis.    Small areas of venous hypoenhancement in the superior pole of the spleen,   suggestive of infarct, decreased in size from prior study.    No renal infarcts.
Patient is a 53y old  Female who presented with a chief complaint of ABD pain (2018 18:51)    INTERVAL HPI/OVERNIGHT EVENTS:  Feels much better today.  Tolerating solids    MEDICATIONS  (STANDING):  aspirin enteric coated 81 milliGRAM(s) Oral daily  ciprofloxacin   IVPB 400 milliGRAM(s) IV Intermittent every 12 hours  clopidogrel Tablet 75 milliGRAM(s) Oral daily  enalapril 10 milliGRAM(s) Oral two times a day  fluticasone propionate 50 MICROgram(s)/spray Nasal Spray 1 Spray(s) Both Nostrils daily  metoprolol succinate  milliGRAM(s) Oral daily  metroNIDAZOLE  IVPB 500 milliGRAM(s) IV Intermittent every 8 hours  nicotine -  14 mG/24Hr(s) Patch 1 patch Transdermal daily  pantoprazole    Tablet 40 milliGRAM(s) Oral before breakfast  rosuvastatin 10 milliGRAM(s) Oral at bedtime  sodium chloride 0.9%. 1000 milliLiter(s) (100 mL/Hr) IV Continuous <Continuous>  spironolactone 25 milliGRAM(s) Oral two times a day  sucralfate 1 Gram(s) Oral two times a day    MEDICATIONS  (PRN):  ALPRAZolam 0.5 milliGRAM(s) Oral three times a day PRN anxiety  oxyCODONE    5 mG/acetaminophen 325 mG 1 Tablet(s) Oral every 4 hours PRN Moderate Pain (4 - 6)/Severe Pain (7 - 10)  simethicone 80 milliGRAM(s) Chew four times a day PRN / bloating    Allergies  No Known Allergies    Review of Systems:  General:  No wt loss, fevers, chills, night sweats,fatigue,   ENT:  No sore throat, pain, runny nose, dysphagia  CV:  No pain, palpitatioins, hypo/hypertension  Resp:  No dyspnea, cough, tachypnea, wheezing  Neuro:  No weakness, tingling, memory problems  Heme:  No petechiae, ecchymosis, easy bruisability    T(F): 97.7 (18 @ 04:24), Max: 98 (18 @ 13:15)  HR: 68 (18 @ 04:24) (63 - 80)  BP: 111/74 (18 @ 04:24) (101/68 - 180/99)  RR: 18 (18 @ 04:24) (18 - 19)  SpO2: 100% (18 @ 04:24) (98% - 100%)  Wt(kg): --  ,   I&O's Summary    2018 07:01  -  2018 07:00  --------------------------------------------------------  IN: 1360 mL / OUT: 0 mL / NET: 1360 mL    PHYSICAL EXAM:  Constitutional: NAD, well-developed  Neck: No LAD, supple  Respiratory: clear to auscultation b/l no rales, rhonchi, wheezing  Cardiovascular: S1 and S2, RRR, no murmur  Gastrointestinal: +BS x4, soft, NT/ND, neg HSM,  Extremities: No peripheral edema, neg clubbing, cyanosis  Vascular: 2+ peripheral pulses  Neurological: A/O x 3, no focal deficits  Psychiatric: Normal mood, normal affect  Skin: No rashes    LABS:                      13.9   4.2   )-----------( 355      ( 2018 07:06 )             43.4     06-    139  |  102  |  11  ----------------------------<  88  4.1   |  24  |  1.01    Ca    9.9      2018 07:06  Phos  3.8     -  Mg     1.8     -    TPro  6.7  /  Alb  3.7  /  TBili  0.2  /  DBili  x   /  AST  18  /  ALT  36  /  AlkPhos  104  06-28    PT/INR - ( 2018 06:38 )   PT: 10.5 sec;   INR: 0.97 ratio    PTT - ( 2018 11:30 )  PTT:26.3 sec    Urinalysis Basic - ( 2018 14:04 )  Color: Red / Appearance: Turbid / S.021 / pH: x  Gluc: x / Ketone: Negative  / Bili: Negative / Urobili: Negative   Blood: x / Protein: 30 mg/dL / Nitrite: Negative   Leuk Esterase: Large / RBC: x / WBC 2-5 /HPF   Sq Epi: x / Non Sq Epi: Occasional /HPF / Bacteria: Moderate /HPF    RADIOLOGY & ADDITIONAL TESTS:   CT Abdomen and Pelvis w/ Oral Cont and w/ IV Cont (18 @ 14:14) >    EXAM:  CT ABDOMEN AND PELVIS OC IC                            PROCEDURE DATE:  2018    INTERPRETATION:  CLINICAL INFORMATION: Abdominal pain left lower quadrant   pain with bloody diarrhea for one day    COMPARISON: MR of the abdomen with and without IV contrast 10/25/2010    PROCEDURE:   CT of the Abdomen and Pelvis was performed with intravenous contrast.   Intravenous contrast: 90 ml Omnipaque 350. 10 ml discarded.  Oral contrast: positive contrast was administered.  Sagittal and coronal reformats were performed.    FINDINGS:    LOWER CHEST: Within normal limits.    LIVER: Cirrhotic liver morphology and hepatomegaly.  BILE DUCTS: The common bile duct is dilated measuring up to 1.0 cm.  GALLBLADDER: Cholecystectomy.  SPLEEN: Focal wedge-shaped hypodensities within the spleen consistent   with splenic infarct.  PANCREAS: Within normal limits.  ADRENALS: Right-sided adrenal nodule measuring 2.1 x 1.7 cm.  KIDNEYS/URETERS: Bilateral irregular cortical hypodensities.     BLADDER: Within normal limits.  REPRODUCTIVE ORGANS: The uterus is within normal limits. There is a 3.6   cm right-sided ovarian cyst.    BOWEL: No bowel obstruction. Mild pancolonic thickening. Questionable   inflammation of the terminal ileum. Appendix is normal. Duodenal   diverticulum.  PERITONEUM: No ascites.  VESSELS:  Atherosclerotic changes.  RETROPERITONEUM: No lymphadenopathy.    ABDOMINAL WALL: Within normal limits.  BONES: Degenerative changes of the spine.     IMPRESSION:   Mild pancolitis. Questionable inflammation of the terminal ileum.  Upper pole splenic infarcts.    Bilateral irregular cortical hypodense areas in the kidneys of uncertain   etiology; possibly small infarcts or pyelonephritis.  < from: CT Angio Abdomen and Pelvis w/ IV Cont (18 @ 10:23) >  EXAM:  CT ANGIO ABD PELV (W)AW IC                                PROCEDURE DATE:  2018    INTERPRETATION:  CLINICAL INFORMATION: Diarrhea for 3 weeks. Recent CT   shows colitis with splenic infarcts and possible renal infarcts.    COMPARISON: CT abdomen pelvis from 2018    PROCEDURE:   CT Angiography of the Abdomen and Pelvis.   Images were obtained in the arterial phase and venous phases.  Intravenous contrast: 90 ml Omnipaque 350. 10 ml discarded.  Oral contrast: None.  Sagittal and coronal reformats were performed as well as MIPS.    FINDINGS:    LOWER CHEST: Within normal limits.    LIVER: Within normal limits.  BILE DUCTS: Pneumobilia   GALLBLADDER: Cholecystectomy.  SPLEEN: Small foci of hypoenhancement in the superior aspect of the   spleen, decreased in size from prior study. Splenule.  PANCREAS: Within normal limits.  ADRENALS: Unchanged 1.7 cm right adrenal nodule.  KIDNEYS/URETERS: Normal arterial phase renal enhancement. Redemonstrated   cortical hypoenhancement on venous phase.    BLADDER: Within normal limits.  REPRODUCTIVE ORGANS:  Unchanged 3.6 cm right ovarian cyst. The uterus and   adnexa are otherwise within normal limits     BOWEL: Improved pancolonic wall thickening. Duodenal diverticulum. No   bowel obstruction. Appendix normal.  PERITONEUM: No ascites.  VESSELS:  No major vessel occlusion or high-grade stenosis. Mild aortic   atherosclerosis. Mild to moderate celiac axis stenosis.  RETROPERITONEUM: No lymphadenopathy.    ABDOMINAL WALL: Within normal limits.  BONES: Within normal limits.    IMPRESSION: Patent abdominal vasculature.     Improved pancolitis.    Small areas of venous hypoenhancement in the superior pole of the spleen,   suggestive of infarct, decreased in size from prior study.    No renal infarcts.

## 2018-07-01 NOTE — DISCHARGE NOTE ADULT - CARE PLAN
Principal Discharge DX:	Pancolitis Principal Discharge DX:	Pancolitis  Goal:	Resolving.  Assessment and plan of treatment:	Continue Cipro and Flagyl x 4 additional days to complete 7 day course.   Follow up with Dr. Ho to schedule EGD and Colonoscopy in 6-8 weeks.   Continue present medication regimen.

## 2018-07-01 NOTE — DISCHARGE NOTE ADULT - MEDICATION SUMMARY - MEDICATIONS TO STOP TAKING
I will STOP taking the medications listed below when I get home from the hospital:    felodipine 5 mg oral tablet, extended release  -- 1 tab(s) by mouth once a day (in the morning)

## 2018-07-01 NOTE — DISCHARGE NOTE ADULT - PLAN OF CARE
Resolving. Continue Cipro and Flagyl x 4 additional days to complete 7 day course.   Follow up with Dr. Ho to schedule EGD and Colonoscopy in 6-8 weeks.   Continue present medication regimen.

## 2018-07-01 NOTE — CHART NOTE - NSCHARTNOTEFT_GEN_A_CORE
Patient is a 53y old  Female who presents with a chief complaint of ABD pain (01 Jul 2018 10:58)  No complaints at present.  Pt is ambulatory in the unit.  Gait steady.      Vital Signs Last 24 Hrs  T(C): 36.9 (01 Jul 2018 03:41), Max: 36.9 (01 Jul 2018 03:41)  T(F): 98.4 (01 Jul 2018 03:41), Max: 98.4 (01 Jul 2018 03:41)  HR: 80 (01 Jul 2018 06:02) (71 - 80)  BP: 130/88 (01 Jul 2018 06:02) (107/77 - 130/88)  BP(mean): --  RR: 18 (01 Jul 2018 03:41) (18 - 18)  SpO2: 98% (01 Jul 2018 03:41) (98% - 98%)      Labs:                          14.0   6.9   )-----------( 383      ( 30 Jun 2018 06:26 )             42.3     06-30    142  |  99  |  19  ----------------------------<  102<H>  4.4   |  31  |  1.07    Ca    10.5      30 Jun 2018 06:26      Physical Exam:  General: WN/WD NAD  Neurology: A&Ox3, nonfocal, MACIEL x 4  Head:  Normocephalic, atraumatic  Respiratory: CTA B/L  CV: RRR, S1S2, no murmur  Abdominal: Soft, NT, ND no palpable mass  MSK: No edema, + peripheral pulses, FROM all 4 extremity    Discharge Note and Plan:  >Follow up with PMD, Dr. Lamas in 1 week.   > Continue present medication regimen. Pt picked up her meds in [place].   >She will follow up with Dr. Ho outpatient to schedule EGD/Colonoscopy in 6-8 weeks.      Olga Ashley [facility]  Spectralink #[phone]

## 2018-07-02 LAB
B2 GLYCOPROT1 AB SER QL: NEGATIVE — SIGNIFICANT CHANGE UP
CARDIOLIPIN AB SER-ACNC: NEGATIVE — SIGNIFICANT CHANGE UP
CULTURE RESULTS: SIGNIFICANT CHANGE UP
CULTURE RESULTS: SIGNIFICANT CHANGE UP
SPECIMEN SOURCE: SIGNIFICANT CHANGE UP
SPECIMEN SOURCE: SIGNIFICANT CHANGE UP

## 2018-07-03 LAB
DNA PLOIDY SPEC FC-IMP: SIGNIFICANT CHANGE UP
PTR INTERPRETATION: SIGNIFICANT CHANGE UP

## 2018-08-27 ENCOUNTER — APPOINTMENT (OUTPATIENT)
Dept: ULTRASOUND IMAGING | Facility: CLINIC | Age: 53
End: 2018-08-27
Payer: MEDICAID

## 2018-08-27 ENCOUNTER — OUTPATIENT (OUTPATIENT)
Dept: OUTPATIENT SERVICES | Facility: HOSPITAL | Age: 53
LOS: 1 days | End: 2018-08-27
Payer: MEDICAID

## 2018-08-27 DIAGNOSIS — J38.3 OTHER DISEASES OF VOCAL CORDS: Chronic | ICD-10-CM

## 2018-08-27 DIAGNOSIS — Z00.8 ENCOUNTER FOR OTHER GENERAL EXAMINATION: ICD-10-CM

## 2018-08-27 PROCEDURE — 76700 US EXAM ABDOM COMPLETE: CPT | Mod: 26

## 2018-08-27 PROCEDURE — 76700 US EXAM ABDOM COMPLETE: CPT

## 2019-06-14 NOTE — ED ADULT NURSE NOTE - CAS DISCH CONDITION
Physical Therapy Daily Treatment    Visit Count: 6  Plan of Care: 5/22/2019 Through: 8/20/2019  Insurance Information: 60 visits per year combined therapies   Precautions: hearing loss, spinal stenosis, depression, symptomatic PVC's, morphine for pain control - no tylenol currently due to pancreatitis     SUBJECTIVE   Current Pain (0-10 scale): very sore near drainage tube   Functional Change:  Better than the last visit.  Sore on left side of trunk and abdomen from tube cleaning/draining yesterday- plans to speak with phsyician's office today to discuss the increased pain.      OBJECTIVE     Performed 12 minutes on NuStep with arms prior to treatment today - mostly of the time level 5     Treatment   Neuromuscular Reeducation:  Seated on stability ball:    Bilateral shoulder horizontal abduction (red) 2 x 10    Biceps curls 2 lb 2 x 20   Sidestepping 3 x 15 each way   Retro walk 6 x 10 ft   Standing rows (green) 2 x 15     Therapeutic Exercise:   Seated piriformis stretch x 30 sec   Thoracic spine extension stretch with hands clasped behind back   Standing calf stretch x 30 sec left  Right    Standing hamstrings stretch (light) x 30 sec right  Left     Manual Therapy:   Deferred    Skilled input: as detailed above    Home Program:   Access Code: [email or web address]   Exercises   Supine Diaphragmatic Breathing - 1 sets - 2x daily - 7x weekly   Supine March - 2x daily - 7x weekly   Seated Hip Abduction with Resistance - 10 reps - 2 sets - 2x daily - 7x weekly   Seated Knee Extension with Resistance - 10 reps - 3 sets - 1x daily - 7x weekly     Writer verbally educated the patient and received verbal consent from the patient on hand placement, positioning of patient, and techniques to be performed today including hand placement and palpation for techniques as described above and how they are pertinent to the patient's plan of care.      Suggestions for next session as indicated: progress per plan of care - distraction/gapping  style intervention and core stabilization progression as tolerated based on pain levels with positioning (Careful of drain)    ASSESSMENT   Good posture and body awareness.   Pain after treatment (patient reported, 0-10 scale): 2  Result of above outlined education: Verbalizes understanding and Demonstrates understanding    THERAPY DAILY BILLING   Insurance: [employer] - Financial Reconciliation 2. MEDICARE    Evaluation Procedures:  No evaluation codes were used on this date of service    Timed Procedures:  Neuromuscular Re-Education, 30 minutes  Therapeutic Exercise, 9 minutes    Untimed Procedures:  No untimed codes were used on this date of service    Total Treatment Time: 39 minutes   Stable

## 2020-07-29 ENCOUNTER — EMERGENCY (EMERGENCY)
Facility: HOSPITAL | Age: 55
LOS: 1 days | Discharge: HOME | End: 2020-07-29
Attending: STUDENT IN AN ORGANIZED HEALTH CARE EDUCATION/TRAINING PROGRAM
Payer: MEDICARE

## 2020-07-29 VITALS
RESPIRATION RATE: 18 BRPM | HEART RATE: 80 BPM | OXYGEN SATURATION: 98 % | TEMPERATURE: 98 F | SYSTOLIC BLOOD PRESSURE: 143 MMHG | DIASTOLIC BLOOD PRESSURE: 91 MMHG

## 2020-07-29 VITALS
SYSTOLIC BLOOD PRESSURE: 207 MMHG | OXYGEN SATURATION: 98 % | WEIGHT: 134.92 LBS | RESPIRATION RATE: 18 BRPM | HEART RATE: 111 BPM | TEMPERATURE: 98 F | DIASTOLIC BLOOD PRESSURE: 119 MMHG

## 2020-07-29 DIAGNOSIS — J38.3 OTHER DISEASES OF VOCAL CORDS: Chronic | ICD-10-CM

## 2020-07-29 LAB
ALBUMIN SERPL ELPH-MCNC: 4.4 G/DL — SIGNIFICANT CHANGE UP (ref 3.3–5)
ALP SERPL-CCNC: 119 U/L — SIGNIFICANT CHANGE UP (ref 40–120)
ALT FLD-CCNC: 15 U/L — SIGNIFICANT CHANGE UP (ref 10–45)
ANION GAP SERPL CALC-SCNC: 18 MMOL/L — HIGH (ref 5–17)
AST SERPL-CCNC: 21 U/L — SIGNIFICANT CHANGE UP (ref 10–40)
BASOPHILS # BLD AUTO: 0.05 K/UL — SIGNIFICANT CHANGE UP (ref 0–0.2)
BASOPHILS NFR BLD AUTO: 0.6 % — SIGNIFICANT CHANGE UP (ref 0–2)
BILIRUB SERPL-MCNC: 0.2 MG/DL — SIGNIFICANT CHANGE UP (ref 0.2–1.2)
BUN SERPL-MCNC: 21 MG/DL — SIGNIFICANT CHANGE UP (ref 7–23)
CALCIUM SERPL-MCNC: 10.2 MG/DL — SIGNIFICANT CHANGE UP (ref 8.4–10.5)
CHLORIDE SERPL-SCNC: 99 MMOL/L — SIGNIFICANT CHANGE UP (ref 96–108)
CO2 SERPL-SCNC: 25 MMOL/L — SIGNIFICANT CHANGE UP (ref 22–31)
CREAT SERPL-MCNC: 0.97 MG/DL — SIGNIFICANT CHANGE UP (ref 0.5–1.3)
EOSINOPHIL # BLD AUTO: 0.2 K/UL — SIGNIFICANT CHANGE UP (ref 0–0.5)
EOSINOPHIL NFR BLD AUTO: 2.5 % — SIGNIFICANT CHANGE UP (ref 0–6)
GLUCOSE SERPL-MCNC: 121 MG/DL — HIGH (ref 70–99)
HCT VFR BLD CALC: 42.1 % — SIGNIFICANT CHANGE UP (ref 34.5–45)
HGB BLD-MCNC: 13.8 G/DL — SIGNIFICANT CHANGE UP (ref 11.5–15.5)
IMM GRANULOCYTES NFR BLD AUTO: 0.3 % — SIGNIFICANT CHANGE UP (ref 0–1.5)
LYMPHOCYTES # BLD AUTO: 2.59 K/UL — SIGNIFICANT CHANGE UP (ref 1–3.3)
LYMPHOCYTES # BLD AUTO: 32.9 % — SIGNIFICANT CHANGE UP (ref 13–44)
MCHC RBC-ENTMCNC: 30.3 PG — SIGNIFICANT CHANGE UP (ref 27–34)
MCHC RBC-ENTMCNC: 32.8 GM/DL — SIGNIFICANT CHANGE UP (ref 32–36)
MCV RBC AUTO: 92.5 FL — SIGNIFICANT CHANGE UP (ref 80–100)
MONOCYTES # BLD AUTO: 0.56 K/UL — SIGNIFICANT CHANGE UP (ref 0–0.9)
MONOCYTES NFR BLD AUTO: 7.1 % — SIGNIFICANT CHANGE UP (ref 2–14)
NEUTROPHILS # BLD AUTO: 4.45 K/UL — SIGNIFICANT CHANGE UP (ref 1.8–7.4)
NEUTROPHILS NFR BLD AUTO: 56.6 % — SIGNIFICANT CHANGE UP (ref 43–77)
NRBC # BLD: 0 /100 WBCS — SIGNIFICANT CHANGE UP (ref 0–0)
PLATELET # BLD AUTO: 364 K/UL — SIGNIFICANT CHANGE UP (ref 150–400)
POTASSIUM SERPL-MCNC: 3.5 MMOL/L — SIGNIFICANT CHANGE UP (ref 3.5–5.3)
POTASSIUM SERPL-SCNC: 3.5 MMOL/L — SIGNIFICANT CHANGE UP (ref 3.5–5.3)
PROT SERPL-MCNC: 7.4 G/DL — SIGNIFICANT CHANGE UP (ref 6–8.3)
RBC # BLD: 4.55 M/UL — SIGNIFICANT CHANGE UP (ref 3.8–5.2)
RBC # FLD: 13.8 % — SIGNIFICANT CHANGE UP (ref 10.3–14.5)
SODIUM SERPL-SCNC: 142 MMOL/L — SIGNIFICANT CHANGE UP (ref 135–145)
TROPONIN T, HIGH SENSITIVITY RESULT: 6 NG/L — SIGNIFICANT CHANGE UP (ref 0–51)
TROPONIN T, HIGH SENSITIVITY RESULT: <6 NG/L — SIGNIFICANT CHANGE UP (ref 0–51)
WBC # BLD: 7.87 K/UL — SIGNIFICANT CHANGE UP (ref 3.8–10.5)
WBC # FLD AUTO: 7.87 K/UL — SIGNIFICANT CHANGE UP (ref 3.8–10.5)

## 2020-07-29 PROCEDURE — 99284 EMERGENCY DEPT VISIT MOD MDM: CPT | Mod: 25

## 2020-07-29 PROCEDURE — 85027 COMPLETE CBC AUTOMATED: CPT

## 2020-07-29 PROCEDURE — 93005 ELECTROCARDIOGRAM TRACING: CPT

## 2020-07-29 PROCEDURE — 71045 X-RAY EXAM CHEST 1 VIEW: CPT | Mod: 26

## 2020-07-29 PROCEDURE — 80053 COMPREHEN METABOLIC PANEL: CPT

## 2020-07-29 PROCEDURE — 99285 EMERGENCY DEPT VISIT HI MDM: CPT | Mod: GC

## 2020-07-29 PROCEDURE — 71275 CT ANGIOGRAPHY CHEST: CPT

## 2020-07-29 PROCEDURE — 84484 ASSAY OF TROPONIN QUANT: CPT

## 2020-07-29 PROCEDURE — 93010 ELECTROCARDIOGRAM REPORT: CPT

## 2020-07-29 PROCEDURE — 74174 CTA ABD&PLVS W/CONTRAST: CPT | Mod: 26

## 2020-07-29 PROCEDURE — 71275 CT ANGIOGRAPHY CHEST: CPT | Mod: 26

## 2020-07-29 PROCEDURE — 74174 CTA ABD&PLVS W/CONTRAST: CPT

## 2020-07-29 PROCEDURE — 71045 X-RAY EXAM CHEST 1 VIEW: CPT

## 2020-07-29 PROCEDURE — 96374 THER/PROPH/DIAG INJ IV PUSH: CPT | Mod: XU

## 2020-07-29 RX ORDER — LIDOCAINE 4 G/100G
1 CREAM TOPICAL ONCE
Refills: 0 | Status: COMPLETED | OUTPATIENT
Start: 2020-07-29 | End: 2020-07-29

## 2020-07-29 RX ORDER — METOPROLOL TARTRATE 50 MG
50 TABLET ORAL ONCE
Refills: 0 | Status: DISCONTINUED | OUTPATIENT
Start: 2020-07-29 | End: 2020-07-29

## 2020-07-29 RX ORDER — ASPIRIN/CALCIUM CARB/MAGNESIUM 324 MG
324 TABLET ORAL ONCE
Refills: 0 | Status: COMPLETED | OUTPATIENT
Start: 2020-07-29 | End: 2020-07-29

## 2020-07-29 RX ORDER — LABETALOL HCL 100 MG
10 TABLET ORAL ONCE
Refills: 0 | Status: COMPLETED | OUTPATIENT
Start: 2020-07-29 | End: 2020-07-29

## 2020-07-29 RX ORDER — OXYCODONE HYDROCHLORIDE 5 MG/1
5 TABLET ORAL ONCE
Refills: 0 | Status: DISCONTINUED | OUTPATIENT
Start: 2020-07-29 | End: 2020-07-29

## 2020-07-29 RX ORDER — CYCLOBENZAPRINE HYDROCHLORIDE 10 MG/1
10 TABLET, FILM COATED ORAL ONCE
Refills: 0 | Status: COMPLETED | OUTPATIENT
Start: 2020-07-29 | End: 2020-07-29

## 2020-07-29 RX ORDER — METOPROLOL TARTRATE 50 MG
200 TABLET ORAL ONCE
Refills: 0 | Status: COMPLETED | OUTPATIENT
Start: 2020-07-29 | End: 2020-07-29

## 2020-07-29 RX ORDER — ACETAMINOPHEN 500 MG
975 TABLET ORAL ONCE
Refills: 0 | Status: COMPLETED | OUTPATIENT
Start: 2020-07-29 | End: 2020-07-29

## 2020-07-29 RX ORDER — OXYCODONE HYDROCHLORIDE 5 MG/1
1 TABLET ORAL
Qty: 4 | Refills: 0
Start: 2020-07-29 | End: 2020-07-29

## 2020-07-29 RX ORDER — CYCLOBENZAPRINE HYDROCHLORIDE 10 MG/1
1 TABLET, FILM COATED ORAL
Qty: 9 | Refills: 0
Start: 2020-07-29 | End: 2020-07-31

## 2020-07-29 RX ADMIN — Medication 200 MILLIGRAM(S): at 08:24

## 2020-07-29 RX ADMIN — Medication 324 MILLIGRAM(S): at 07:25

## 2020-07-29 RX ADMIN — Medication 30 MILLILITER(S): at 09:58

## 2020-07-29 RX ADMIN — OXYCODONE HYDROCHLORIDE 5 MILLIGRAM(S): 5 TABLET ORAL at 13:05

## 2020-07-29 RX ADMIN — CYCLOBENZAPRINE HYDROCHLORIDE 10 MILLIGRAM(S): 10 TABLET, FILM COATED ORAL at 08:24

## 2020-07-29 RX ADMIN — LIDOCAINE 1 PATCH: 4 CREAM TOPICAL at 08:25

## 2020-07-29 RX ADMIN — Medication 10 MILLIGRAM(S): at 07:24

## 2020-07-29 RX ADMIN — Medication 975 MILLIGRAM(S): at 08:24

## 2020-07-29 NOTE — ED ADULT NURSE REASSESSMENT NOTE - NS ED NURSE REASSESS COMMENT FT1
pt is aox4, condition improved, vitals stable, resting comfortably, iv in place no signs of infiltration, skin is warm dry pink intact, denies any pain
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
## 2020-07-29 NOTE — ED PROVIDER NOTE - PATIENT PORTAL LINK FT
You can access the FollowMyHealth Patient Portal offered by [facility] by registering at the following website: [email or web address]. By joining [employer]’s FollowMyHealth portal, you will also be able to view your health information using other applications (apps) compatible with our system.

## 2020-07-29 NOTE — ED PROVIDER NOTE - PROGRESS NOTE DETAILS
Moreno DO: pt reassessed, bp improving but pt complaining of left sided rib pain worse with turning, now complaining of feeling it in her left neck and left lower back, does not feel any new numbness/weakness- due to htn, and unchanged pain/ persistent symptoms and now migration, plan to check cta to rule out dissection, no pulse deficits Will continue to assess Moreno DO: no aortic dissection, pt informed of increased size in prominent 1 cm right lower hilar lymph nodes, 5.2 x 3.7 cm right adnexal cyst. Moreno DO: no aortic dissection, pt informed of increased size in prominent 1 cm right lower hilar lymph nodes, 5.2 x 3.7 cm right adnexal cyst, pain still present but reproducible no new neuro deficits, likely msk, pt recommended for pmd f/u regarding findings, trop stable, given return precautions. Moreno DO: no aortic dissection, pt informed of increased size in prominent 1 cm right lower hilar lymph nodes, 5.2 x 3.7 cm right adnexal cyst, pain still present but reproducible no new neuro deficits, likely msk, pt recommended for pmd f/u regarding findings, trop stable, given return precautions. ample time provided for questions, pt given copy of results. Moreno DO: pt reassessed, bp improving but pt initially complaining of left sided rib pain worse with turning, now complaining of feeling pain spreading in her left neck and left lower back, still states it is 'severe' discomfort though pt is very well appearing. She does not feel any new numbness/weakness. BPs will vary btn 150s-180s systolic in bilateral arms, unclear reason for this discrepancy. Due to persistent htn, and new pain quality, plan to check cta to rule out dissection, no pulse deficits. Will continue to assess Moreno DO: no aortic dissection, pt informed of increased size in prominent 1 cm right lower hilar lymph nodes, 5.2 x 3.7 cm right adnexal cyst, L chest/ side pain still present but reproducible, no new neuro deficits, likely msk, pt recommended for pmd f/u regarding findings, trop stable, given return precautions. ample time provided for questions, pt states flexeril did not help, educated on limited use of narcotics and will rx only a few tabs of oxycodone for breakthrough pain, pt instructed not to drive while taking medication, pt given copy of results. pt endorsed understanding of follow up care and return precautions.
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
## 2020-07-29 NOTE — ED ADULT NURSE NOTE - NSIMPLEMENTINTERV_GEN_ALL_ED
Implemented All Fall Risk Interventions:  Harmony to call system. Call bell, personal items and telephone within reach. Instruct patient to call for assistance. Room bathroom lighting operational. Non-slip footwear when patient is off stretcher. Physically safe environment: no spills, clutter or unnecessary equipment. Stretcher in lowest position, wheels locked, appropriate side rails in place. Provide visual cue, wrist band, yellow gown, etc. Monitor gait and stability. Monitor for mental status changes and reorient to person, place, and time. Review medications for side effects contributing to fall risk. Reinforce activity limits and safety measures with patient and family.

## 2020-07-29 NOTE — ED PROVIDER NOTE - NS ED ROS FT
GENERAL: No fever or chills, weight changes, nightsweats  EYES: no change in vision  HEENT: no dysplasia, odynophagia, ear pain, rhinorrhea, epistasis   CARDIAC: no chest pain, palpitation   PULMONARY: no productive cough or SOB  GI: no abdominal pain, no nausea or no vomiting, no diarrhea or constipation  : No changes in urination for pain/freq.   SKIN: no rashes, abnormal bruising or bleeding  NEURO: no headache, numbness/tingling, extremity weakness   MSK: left shoulder pain GENERAL: No fever or chills  EYES: no change in vision  HEENT: no dysplasia, odynophagia, ear pain, rhinorrhea, epistasis   CARDIAC: + left chest wall pain   PULMONARY: no productive cough or SOB  GI: no abdominal pain, no nausea or no vomiting, no diarrhea or constipation  : No changes in urination for pain/freq.   SKIN: no rashes, abnormal bruising or bleeding  NEURO: no headache, chronic L sided numbness/tingling and extremity weakness   MSK: left shoulder pain

## 2020-07-29 NOTE — ED PROVIDER NOTE - PHYSICAL EXAMINATION
General: NAD, good hygiene, well developed  HENT: Atraumatic, EOMI, no conjunctivae injection, moist mucosa  Neck: normal ROM and trachea midline   Cardiovascular: tachycardiac, S1&2, no M or R, radial pulses equal and b/l  back: focal tenderness is the posterior upper scapular, no sign of rash or deformity  Respiratory: CTABL, no wheezes or crackles, no decreased breath sounds  Abdominal: soft and non-tender non distended, neg for guarding, no CVA tenderness   Extremities: no edema of the legs/feet, DP/PT equal b/l  Skin: warm, well perfused  Neurologic: left sided weakness, AAOx3  Psych: normal mood and affect
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
## 2021-02-26 ENCOUNTER — OUTPATIENT (OUTPATIENT)
Dept: OUTPATIENT SERVICES | Facility: HOSPITAL | Age: 56
LOS: 1 days | Discharge: STILL ADMITTED OR ADMINISTRATIVE | End: 2021-02-26
Payer: MEDICARE

## 2021-02-26 DIAGNOSIS — J38.3 OTHER DISEASES OF VOCAL CORDS: Chronic | ICD-10-CM

## 2021-02-26 PROCEDURE — 90839 PSYTX CRISIS INITIAL 60 MIN: CPT

## 2021-03-01 DIAGNOSIS — F41.9 ANXIETY DISORDER, UNSPECIFIED: ICD-10-CM
